# Patient Record
Sex: FEMALE | Race: WHITE | NOT HISPANIC OR LATINO | Employment: FULL TIME | URBAN - METROPOLITAN AREA
[De-identification: names, ages, dates, MRNs, and addresses within clinical notes are randomized per-mention and may not be internally consistent; named-entity substitution may affect disease eponyms.]

---

## 2020-04-29 NOTE — PROGRESS NOTES
"Lia Lewis is a 59 year old female who is being evaluated via a billable telephone visit.  She is a new patient. Previously seen at [facility] in [place].     The patient has been notified of following:     \"This telephone visit will be conducted via a call between you and your physician/provider. We have found that certain health care needs can be provided without the need for a physical exam.  This service lets us provide the care you need with a short phone conversation.  If a prescription is necessary we can send it directly to your pharmacy.  If lab work is needed we can place an order for that and you can then stop by our lab to have the test done at a later time.    Telephone visits are billed at different rates depending on your insurance coverage. During this emergency period, for some insurers they may be billed the same as an in-person visit.  Please reach out to your insurance provider with any questions.    If during the course of the call the physician/provider feels a telephone visit is not appropriate, you will not be charged for this service.\"    Patient has given verbal consent for Telephone visit?  Yes    How would you like to obtain your AVS? Mail a copy    Subjective     Lia Lewis is a 59 year old female who presents to clinic today for the following health issues:    HPI  Diabetes Follow-up    How often are you checking your blood sugar? One time daily - 250-300  What time of day are you checking your blood sugars (select all that apply)?  Before meals  Have you had any blood sugars above 200?  Yes   Have you had any blood sugars below 70?  No    What symptoms do you notice when your blood sugar is low?  None    What concerns do you have today about your diabetes?   Other: higher sugars     Do you have any of these symptoms? (Select all that apply)  No numbness or tingling in feet.  No redness, sores or blisters on feet.  No complaints of excessive thirst.  No reports of blurry " "vision.  No significant changes to weight.  Reports she has not been doing that well with diet lately due to working at home due to COVID pandemic. She has not been as active lately either.   She is out of her medications.         Hypothyroidism  Doing well. No concerns    Hyperlipidemia Follow-Up      Are you regularly taking any medication or supplement to lower your cholesterol?   Yes-  Crestor    Are you having muscle aches or other side effects that you think could be caused by your cholesterol lowering medication?  No  She denies any issues with her Crestor.       Hypertension Follow-up      Do you check your blood pressure regularly outside of the clinic? Yes     Are you following a low salt diet? Yes    Are your blood pressures ever more than 140 on the top number (systolic) OR more   than 90 on the bottom number (diastolic), for example 140/90? No    BP Readings from Last 2 Encounters:   10/15/07 110/80     LDL Cholesterol Calculated (mg/dL)   Date Value   2006 81@   04/15/2005 87@       Depression and Anxiety Follow-Up    How are you doing with your depression since your last visit?   A little worse    How are you doing with your anxiety since your last visit?  Worsened if she does'nt have amitript.    Are you having other symptoms that might be associated with depression or anxiety? Yes:  some depression symptoms that she did not have    Have you had a significant life event? OTHER: state of the [place]     Do you have any concerns with your use of alcohol or other drugs? No    Had been on sertraline 100 mg daily. She feels that it was helpful but \"fell off months ago\". Would like to restart.   Takes amitriptyine at bedtime.   Needs refills  Working at home currently.   No plans to hurt herself.   She does not have interest in counseling at this time.     Social History     Tobacco Use     Smoking status: Former Smoker     Last attempt to quit: 1996     Years since quittin.3     Tobacco " comment: no smokers in household   Substance Use Topics     Alcohol use: Yes     Comment: ashok and crm x 1-2/2-3x per yr     Drug use: No     PHQ 2020   PHQ-9 Total Score 12   Q9: Thoughts of better off dead/self-harm past 2 weeks Not at all     KATHLEEN-7 SCORE 2020   Total Score 9              Migraine     Since your last clinic visit, how have your headaches changed?  Worsened    How often are you getting headaches or migraines? More neck tension     Are you able to do normal daily activities when you have a migraine? Yes  Unless she has nausea    Are you taking rescue/relief medications? (Select all that apply) Other: tylenol    How helpful is your rescue/relief medication?  I get some relief    Are you taking any medications to prevent migraines? (Select all that apply)  Other: sertraline    In the past 4 weeks, how often have you gone to urgent care or the emergency room because of your headaches?  0    Having more than usual migraines. 4-5 days per month  Her sertraline had helped in the past. Worse without it.       Patient Active Problem List   Diagnosis     Anxiety state     Migraine     Type 2 diabetes mellitus with hyperglycemia, without long-term current use of insulin (H)     Hyperlipidemia LDL goal <100     Hypothyroidism due to acquired atrophy of thyroid     Past Surgical History:   Procedure Laterality Date     C/SECTION, LOW TRANSVERSE  x 2    3rd child due to large size on US     HC TOOTH EXTRACTION W/FORCEP       TUBAL LIGATION         Social History     Tobacco Use     Smoking status: Former Smoker     Last attempt to quit: 1996     Years since quittin.3     Tobacco comment: no smokers in household   Substance Use Topics     Alcohol use: Yes     Comment: ashok and crm x 1-2/2-3x per yr     Family History   Problem Relation Age of Onset     Breast Cancer Mother      Breast Cancer Maternal Aunt         x2 aunts     Depression Maternal Grandmother      Depression Maternal Aunt       Cancer Maternal Grandfather         skin (not melanoma)     C.AToyD. Maternal Grandfather      Hypertension Maternal Grandfather      Respiratory Father         pulmonary hypertension     Neurologic Disorder Sister         migraines         BP Readings from Last 3 Encounters:   10/15/07 110/80    Wt Readings from Last 3 Encounters:   10/15/07 100.7 kg (222 lb)                    Reviewed and updated as needed this visit by Provider  Problems         Review of Systems   ROS COMP: CONSTITUTIONAL: NEGATIVE for fever, chills, change in weight  EYES: NEGATIVE for vision changes or irritation  ENT/MOUTH: NEGATIVE for ear, mouth and throat problems  RESP: NEGATIVE for significant cough or SOB  CV: NEGATIVE for chest pain, palpitations or peripheral edema  GI: NEGATIVE for nausea, abdominal pain, heartburn, or change in bowel habits  : NEGATIVE for frequency, dysuria, or hematuria  MUSCULOSKELETAL: NEGATIVE for significant arthralgias or myalgia  NEURO: NEGATIVE for weakness, dizziness or paresthesias  ENDOCRINE: NEGATIVE for temperature intolerance, skin/hair changes  PSYCHIATRIC: see above.        Objective   Reported vitals:  There were no vitals taken for this visit.   Conducted by phone.   PSYCH: Alert and oriented times 3; coherent speech, normal   rate and volume, able to articulate logical thoughts, able   to abstract reason, no tangential thoughts, no hallucinations   or delusions  Her affect is normal and pleasant  RESP: No cough, no audible wheezing, able to talk in full sentences  Remainder of exam unable to be completed due to telephone visits    Diagnostic Test Results:  none         Assessment/Plan:  1. Type 2 diabetes mellitus with hyperglycemia, without long-term current use of insulin (H)  Patient is due for labs. She has elevated glucose readings.   Continue with metformin 1000 mg twice daily.   Continue with glipizide at 10 mg daily for now but anticipate that will increase this to 20 mg after labs  available.   Labs next week.   All questions invited, asked and answered to the patient's apparent satisfaction.  Patient agrees to plan.    - lisinopril (ZESTRIL) 5 MG tablet; Take 1 tablet (5 mg) by mouth daily  Dispense: 90 tablet; Refill: 0  - metFORMIN (GLUCOPHAGE) 1000 MG tablet; Take 1 tablet (1,000 mg) by mouth 2 times daily (with meals)  Dispense: 180 tablet; Refill: 0  - glipiZIDE (GLUCOTROL XL) 10 MG 24 hr tablet; Take 1 tablet (10 mg) by mouth daily  Dispense: 90 tablet; Refill: 0    2. Anxiety state  Recommend restart her sertraline.   Discussed how to ramp this up.   Continue with amitriptyline.   - sertraline (ZOLOFT) 50 MG tablet; Take 0.5 tablets (25 mg) by mouth daily for 8 days, THEN 1 tablet (50 mg) daily for 7 days, THEN 2 tablets (100 mg) daily for 14 days.  Dispense: 39 tablet; Refill: 0  - amitriptyline (ELAVIL) 100 MG tablet; Take 1 tablet (100 mg) by mouth At Bedtime  Dispense: 90 tablet; Refill: 0    3. Migraine without aura and without status migrainosus, not intractable  Restart sertraline as this has helped with her migraines in the past.   Refill of imitrex given.   Patient agrees to plan.   - SUMAtriptan (IMITREX) 100 MG tablet; Take 1 tablet (100 mg) by mouth See Admin Instructions WITH ONSET OF MIGRAINE, MAY REPEAT ONCE AFTER 2 HOURS. DO NOT EXCEED 2 TABLETS IN 24 HOURS.  Dispense: 12 tablet; Refill: 3    4. Hyperlipidemia LDL goal <100  Awaiting results. Dose adjustment as needed.    - rosuvastatin (CRESTOR) 20 MG tablet; Take 1 tablet (20 mg) by mouth daily  Dispense: 90 tablet; Refill: 0    5. Hypothyroidism due to acquired atrophy of thyroid  Awaiting results. Dose adjustment as needed.    - levothyroxine (SYNTHROID/LEVOTHROID) 50 MCG tablet; Take 1 tablet (50 mcg) by mouth daily  Dispense: 90 tablet; Refill: 0    Return in about 3 weeks (around 5/22/2020).      Phone call duration:  16 minutes    Marisol Schmidt MD

## 2020-05-01 ENCOUNTER — VIRTUAL VISIT (OUTPATIENT)
Dept: FAMILY MEDICINE | Facility: OTHER | Age: 60
End: 2020-05-01
Payer: COMMERCIAL

## 2020-05-01 ENCOUNTER — TELEPHONE (OUTPATIENT)
Dept: FAMILY MEDICINE | Facility: CLINIC | Age: 60
End: 2020-05-01

## 2020-05-01 DIAGNOSIS — F41.1 ANXIETY STATE: ICD-10-CM

## 2020-05-01 DIAGNOSIS — E78.5 HYPERLIPIDEMIA LDL GOAL <100: ICD-10-CM

## 2020-05-01 DIAGNOSIS — E03.4 HYPOTHYROIDISM DUE TO ACQUIRED ATROPHY OF THYROID: ICD-10-CM

## 2020-05-01 DIAGNOSIS — E11.65 TYPE 2 DIABETES MELLITUS WITH HYPERGLYCEMIA, WITHOUT LONG-TERM CURRENT USE OF INSULIN (H): Primary | ICD-10-CM

## 2020-05-01 DIAGNOSIS — G43.009 MIGRAINE WITHOUT AURA AND WITHOUT STATUS MIGRAINOSUS, NOT INTRACTABLE: ICD-10-CM

## 2020-05-01 PROCEDURE — 96127 BRIEF EMOTIONAL/BEHAV ASSMT: CPT | Mod: 95 | Performed by: FAMILY MEDICINE

## 2020-05-01 PROCEDURE — 99204 OFFICE O/P NEW MOD 45 MIN: CPT | Mod: 95 | Performed by: FAMILY MEDICINE

## 2020-05-01 RX ORDER — AMITRIPTYLINE HYDROCHLORIDE 100 MG/1
100 TABLET ORAL AT BEDTIME
Qty: 90 TABLET | Refills: 0 | Status: SHIPPED | OUTPATIENT
Start: 2020-05-01 | End: 2020-10-04

## 2020-05-01 RX ORDER — SUMATRIPTAN 100 MG/1
100 TABLET, FILM COATED ORAL SEE ADMIN INSTRUCTIONS
Qty: 12 TABLET | Refills: 3 | Status: SHIPPED | OUTPATIENT
Start: 2020-05-01 | End: 2020-09-18

## 2020-05-01 RX ORDER — LISINOPRIL 5 MG/1
5 TABLET ORAL DAILY
Qty: 90 TABLET | Refills: 0 | Status: SHIPPED | OUTPATIENT
Start: 2020-05-01 | End: 2020-11-02

## 2020-05-01 RX ORDER — GLIPIZIDE 10 MG/1
10 TABLET, FILM COATED, EXTENDED RELEASE ORAL DAILY
Qty: 90 TABLET | Refills: 0 | Status: SHIPPED | OUTPATIENT
Start: 2020-05-01 | End: 2020-05-04 | Stop reason: ALTCHOICE

## 2020-05-01 RX ORDER — SERTRALINE HYDROCHLORIDE 100 MG/1
100 TABLET, FILM COATED ORAL DAILY
Status: CANCELLED | OUTPATIENT
Start: 2020-05-01

## 2020-05-01 RX ORDER — ROSUVASTATIN CALCIUM 20 MG/1
20 TABLET, COATED ORAL DAILY
COMMUNITY
End: 2020-05-01

## 2020-05-01 RX ORDER — AMITRIPTYLINE HYDROCHLORIDE 100 MG/1
100 TABLET ORAL AT BEDTIME
COMMUNITY
End: 2020-05-01

## 2020-05-01 RX ORDER — LEVOTHYROXINE SODIUM 50 UG/1
50 TABLET ORAL DAILY
COMMUNITY
End: 2020-05-01

## 2020-05-01 RX ORDER — LISINOPRIL 5 MG/1
5 TABLET ORAL DAILY
COMMUNITY
End: 2020-05-01

## 2020-05-01 RX ORDER — ROSUVASTATIN CALCIUM 20 MG/1
20 TABLET, COATED ORAL DAILY
Qty: 90 TABLET | Refills: 0 | Status: SHIPPED | OUTPATIENT
Start: 2020-05-01 | End: 2021-01-06

## 2020-05-01 RX ORDER — LEVOTHYROXINE SODIUM 50 UG/1
50 TABLET ORAL DAILY
Qty: 90 TABLET | Refills: 0 | Status: SHIPPED | OUTPATIENT
Start: 2020-05-01 | End: 2020-11-02

## 2020-05-01 ASSESSMENT — ANXIETY QUESTIONNAIRES
1. FEELING NERVOUS, ANXIOUS, OR ON EDGE: MORE THAN HALF THE DAYS
7. FEELING AFRAID AS IF SOMETHING AWFUL MIGHT HAPPEN: NEARLY EVERY DAY
5. BEING SO RESTLESS THAT IT IS HARD TO SIT STILL: NOT AT ALL
2. NOT BEING ABLE TO STOP OR CONTROL WORRYING: MORE THAN HALF THE DAYS
IF YOU CHECKED OFF ANY PROBLEMS ON THIS QUESTIONNAIRE, HOW DIFFICULT HAVE THESE PROBLEMS MADE IT FOR YOU TO DO YOUR WORK, TAKE CARE OF THINGS AT HOME, OR GET ALONG WITH OTHER PEOPLE: VERY DIFFICULT
GAD7 TOTAL SCORE: 9
3. WORRYING TOO MUCH ABOUT DIFFERENT THINGS: SEVERAL DAYS
6. BECOMING EASILY ANNOYED OR IRRITABLE: NOT AT ALL

## 2020-05-01 ASSESSMENT — PATIENT HEALTH QUESTIONNAIRE - PHQ9
SUM OF ALL RESPONSES TO PHQ QUESTIONS 1-9: 12
5. POOR APPETITE OR OVEREATING: SEVERAL DAYS

## 2020-05-01 NOTE — TELEPHONE ENCOUNTER
Please assist in scheduling a follow up visit with me for anxiety.  Please schedule in 3 weeks. I do not have a schedule in but she can be placed on any day at 1 pm if that works for her. Let me know if not.

## 2020-05-01 NOTE — TELEPHONE ENCOUNTER
Scheduled patient for 5/15/20 1:00 for a telephone visit.  Left message for patient to call back to let her know.

## 2020-05-01 NOTE — TELEPHONE ENCOUNTER
3 weeks is 5/22/20.  Appointment changed to 5/22/20 1:00 for telephone visit.  Message was left for patient to call back.  Please inform her of appointment when call is returned.

## 2020-05-01 NOTE — TELEPHONE ENCOUNTER
Next 5 appointments (look out 90 days)    May 15, 2020  1:00 PM CDT  Telephone Visit with Marisol Schmidt MD  [facility] [address]  [facility] [address] [phone]

## 2020-05-02 ASSESSMENT — ANXIETY QUESTIONNAIRES: GAD7 TOTAL SCORE: 9

## 2020-05-04 ENCOUNTER — TELEPHONE (OUTPATIENT)
Dept: FAMILY MEDICINE | Facility: CLINIC | Age: 60
End: 2020-05-04

## 2020-05-04 DIAGNOSIS — E11.65 TYPE 2 DIABETES MELLITUS WITH HYPERGLYCEMIA, WITHOUT LONG-TERM CURRENT USE OF INSULIN (H): Primary | ICD-10-CM

## 2020-05-04 DIAGNOSIS — E11.65 TYPE 2 DIABETES MELLITUS WITH HYPERGLYCEMIA, WITHOUT LONG-TERM CURRENT USE OF INSULIN (H): ICD-10-CM

## 2020-05-04 DIAGNOSIS — E03.4 HYPOTHYROIDISM DUE TO ACQUIRED ATROPHY OF THYROID: ICD-10-CM

## 2020-05-04 LAB
ALBUMIN SERPL-MCNC: 3.5 G/DL (ref 3.4–5)
ALP SERPL-CCNC: 100 U/L (ref 40–150)
ALT SERPL W P-5'-P-CCNC: 38 U/L (ref 0–50)
ANION GAP SERPL CALCULATED.3IONS-SCNC: 7 MMOL/L (ref 3–14)
AST SERPL W P-5'-P-CCNC: 24 U/L (ref 0–45)
BILIRUB SERPL-MCNC: 0.4 MG/DL (ref 0.2–1.3)
BUN SERPL-MCNC: 13 MG/DL (ref 7–30)
CALCIUM SERPL-MCNC: 8.7 MG/DL (ref 8.5–10.1)
CHLORIDE SERPL-SCNC: 105 MMOL/L (ref 94–109)
CHOLEST SERPL-MCNC: 138 MG/DL
CO2 SERPL-SCNC: 26 MMOL/L (ref 20–32)
CREAT SERPL-MCNC: 0.8 MG/DL (ref 0.52–1.04)
CREAT UR-MCNC: 201 MG/DL
GFR SERPL CREATININE-BSD FRML MDRD: 80 ML/MIN/{1.73_M2}
GLUCOSE SERPL-MCNC: 319 MG/DL (ref 70–99)
HBA1C MFR BLD: 12.8 % (ref 0–5.6)
HDLC SERPL-MCNC: 61 MG/DL
LDLC SERPL CALC-MCNC: 44 MG/DL
MICROALBUMIN UR-MCNC: 259 MG/L
MICROALBUMIN/CREAT UR: 128.86 MG/G CR (ref 0–25)
NONHDLC SERPL-MCNC: 77 MG/DL
POTASSIUM SERPL-SCNC: 3.8 MMOL/L (ref 3.4–5.3)
PROT SERPL-MCNC: 7.5 G/DL (ref 6.8–8.8)
SODIUM SERPL-SCNC: 138 MMOL/L (ref 133–144)
T4 FREE SERPL-MCNC: 0.93 NG/DL (ref 0.76–1.46)
TRIGL SERPL-MCNC: 167 MG/DL
TSH SERPL DL<=0.005 MIU/L-ACNC: 7.22 MU/L (ref 0.4–4)

## 2020-05-04 PROCEDURE — 80061 LIPID PANEL: CPT | Performed by: FAMILY MEDICINE

## 2020-05-04 PROCEDURE — 80053 COMPREHEN METABOLIC PANEL: CPT | Performed by: FAMILY MEDICINE

## 2020-05-04 PROCEDURE — 82043 UR ALBUMIN QUANTITATIVE: CPT | Performed by: FAMILY MEDICINE

## 2020-05-04 PROCEDURE — 84439 ASSAY OF FREE THYROXINE: CPT | Performed by: FAMILY MEDICINE

## 2020-05-04 PROCEDURE — 84443 ASSAY THYROID STIM HORMONE: CPT | Performed by: FAMILY MEDICINE

## 2020-05-04 PROCEDURE — 36415 COLL VENOUS BLD VENIPUNCTURE: CPT | Performed by: FAMILY MEDICINE

## 2020-05-04 PROCEDURE — 83036 HEMOGLOBIN GLYCOSYLATED A1C: CPT | Performed by: FAMILY MEDICINE

## 2020-05-04 RX ORDER — LIRAGLUTIDE 6 MG/ML
INJECTION SUBCUTANEOUS
Qty: 6 ML | Refills: 1 | Status: SHIPPED | OUTPATIENT
Start: 2020-05-04 | End: 2020-05-07

## 2020-05-04 NOTE — TELEPHONE ENCOUNTER
Spoke with patient regarding results.     A1C 12.3. Discussed the importance of improvement in diet and activity. Will have her continue with Metformin 1000 mg twice daily. Discontinue glipizide.     Start Victoza 0.6 mg daily for one week then increase to 1.2 mg daily.  She reports knowing how to inject medication. Son has diabetes. She has no concerns with starting.     Recheck in 3 weeks as planned. Sooner if any concerns.     Discussed elevated TSH but normal T4. Will continue current dose and recheck labs in 3 months.     All questions invited, asked and answered to the patient's apparent satisfaction.  Patient agrees to plan.

## 2020-05-07 ENCOUNTER — TELEPHONE (OUTPATIENT)
Dept: SCHEDULING | Facility: CLINIC | Age: 60
End: 2020-05-07

## 2020-05-07 ENCOUNTER — TELEPHONE (OUTPATIENT)
Dept: FAMILY MEDICINE | Facility: CLINIC | Age: 60
End: 2020-05-07

## 2020-05-07 DIAGNOSIS — E11.65 TYPE 2 DIABETES MELLITUS WITH HYPERGLYCEMIA, WITHOUT LONG-TERM CURRENT USE OF INSULIN (H): Primary | ICD-10-CM

## 2020-05-07 DIAGNOSIS — E11.65 TYPE 2 DIABETES MELLITUS WITH HYPERGLYCEMIA, WITHOUT LONG-TERM CURRENT USE OF INSULIN (H): ICD-10-CM

## 2020-05-07 NOTE — TELEPHONE ENCOUNTER
Sent a prescription for Levemir to use instead of Victoza.     Levemir is 10 units once daily in the evening. It is a long acting insulin.

## 2020-05-07 NOTE — TELEPHONE ENCOUNTER
Patient called about medication - Victoza- states that it is really expensive. What is an alternative medication she can try?    [phone]    Ok to leave message    Pended pharmacy

## 2020-05-07 NOTE — TELEPHONE ENCOUNTER
Reason for call:  Other   Patient called regarding (reason for call): prescription  Additional comments: Patient states the Levamir and Victoza cost too much, is wondering about increasing dosage of Glipizide and how to go about doing so.    Phone number to reach patient:  Home number on file [phone] (home)    Best Time:  Any    Can we leave a detailed message on this number?  YES    Travel screening: Not Applicable

## 2020-05-08 RX ORDER — GLIPIZIDE 10 MG/1
20 TABLET, FILM COATED, EXTENDED RELEASE ORAL DAILY
Qty: 180 TABLET | Refills: 0 | Status: SHIPPED | OUTPATIENT
Start: 2020-05-08 | End: 2020-09-18

## 2020-05-08 NOTE — TELEPHONE ENCOUNTER
I called the pharmacy and they stated that Lantus and Basaglar are equivalent to the Levemir in cost and that it wouldn't change to cost for the patient

## 2020-05-08 NOTE — TELEPHONE ENCOUNTER
Please let patient know that all long acting insulins are about the same cost.     I do not think that increasing her Glipizide alone and continuing the metformin will be enough on its own to bring the A1C in line.  It will also take dietary changes and increase in physical activity to help the medications.     I will send a new prescription for her for the glipizide XL for a total of 20 mg daily. Continue metformin.

## 2020-05-08 NOTE — TELEPHONE ENCOUNTER
Please check with pharmacy if Lantus or Basaglar would be less expensive for this patient than the Levemir.

## 2020-05-08 NOTE — TELEPHONE ENCOUNTER
Left message for pt to return call, when call is returned give information below.    Rachel Francis CMA ([facility])

## 2020-05-11 NOTE — TELEPHONE ENCOUNTER
Left message asking patient to return call.    Please tell pt message from Dr. Schmidt:    Please let patient know that all long acting insulins are about the same cost.      I do not think that increasing her Glipizide alone and continuing the metformin will be enough on its own to bring the A1C in line.  It will also take dietary changes and increase in physical activity to help the medications.      I will send a new prescription for her for the glipizide XL for a total of 20 mg daily. Continue metformin.

## 2020-05-11 NOTE — TELEPHONE ENCOUNTER
Patient called back I relayed message below. No further concerns or questions at this time.    Lincoln Fisher MA on 5/11/2020 at 12:31 PM

## 2020-05-31 DIAGNOSIS — F41.1 ANXIETY STATE: ICD-10-CM

## 2020-06-01 NOTE — TELEPHONE ENCOUNTER
Patient is due for med recheck. Please call and schedule for video visit before the patient runs out of the medication. If they do not have enough to make it to their video visit, send back to the RN. Indicate quantity that patient will need to make it to their appointment.    Alicia Mcguire, RN, BSN

## 2020-06-04 NOTE — PROGRESS NOTES
"Lia Lewis is a 59 year old female who is being evaluated via a billable telephone visit.      The patient has been notified of following:     \"This telephone visit will be conducted via a call between you and your physician/provider. We have found that certain health care needs can be provided without the need for a physical exam.  This service lets us provide the care you need with a short phone conversation.  If a prescription is necessary we can send it directly to your pharmacy.  If lab work is needed we can place an order for that and you can then stop by our lab to have the test done at a later time.    Telephone visits are billed at different rates depending on your insurance coverage. During this emergency period, for some insurers they may be billed the same as an in-person visit.  Please reach out to your insurance provider with any questions.    If during the course of the call the physician/provider feels a telephone visit is not appropriate, you will not be charged for this service.\"    Patient has given verbal consent for Telephone visit?  Yes    What phone number would you like to be contacted at? [phone]    How would you like to obtain your AVS? Mail a copy    Subjective     Lia Lewis is a 59 year old female who presents via phone visit today for the following health issues:    HPI  Diabetes Follow-up    How often are you checking your blood sugar? One time daily  What time of day are you checking your blood sugars (select all that apply)?  in the morning  Have you had any blood sugars above 200?  Yes 3 times in last 2 weeks  Have you had any blood sugars below 70?  No    What symptoms do you notice when your blood sugar is low?  Not applicable    What concerns do you have today about your diabetes? Blood sugar is often over 200 only a few times in the last couple of weeks     Do you have any of these symptoms? (Select all that apply)  No numbness or tingling in feet.  No redness, " sores or blisters on feet.  No complaints of excessive thirst.  No reports of blurry vision.  No significant changes to weight.    Have you had a diabetic eye exam in the last 12 months? No        BP Readings from Last 2 Encounters:   10/15/07 110/80     Hemoglobin A1C (%)   Date Value   2020 12.8 (H)     LDL Cholesterol Calculated (mg/dL)   Date Value   2020 44   2006 81@         How many servings of fruits and vegetables do you eat daily?  4 or more    On average, how many sweetened beverages do you drink each day (Examples: soda, juice, sweet tea, etc.  Do NOT count diet or artificially sweetened beverages)?   1    How many days per week do you exercise enough to make your heart beat faster? 4    How many minutes a day do you exercise enough to make your heart beat faster? 30 - 60    How many days per week do you miss taking your medication? 0    Patient Active Problem List   Diagnosis     Anxiety state     Migraine     Type 2 diabetes mellitus with hyperglycemia, without long-term current use of insulin (H)     Hyperlipidemia LDL goal <100     Hypothyroidism due to acquired atrophy of thyroid     Past Surgical History:   Procedure Laterality Date     C/SECTION, LOW TRANSVERSE  x 2    3rd child due to large size on US     HC TOOTH EXTRACTION W/FORCEP       TUBAL LIGATION         Social History     Tobacco Use     Smoking status: Former Smoker     Last attempt to quit: 1996     Years since quittin.4     Smokeless tobacco: Never Used     Tobacco comment: no smokers in household   Substance Use Topics     Alcohol use: Yes     Comment: ashok and crm x 1-2/2-3x per yr     Family History   Problem Relation Age of Onset     Breast Cancer Mother      Breast Cancer Maternal Aunt         x2 aunts     Depression Maternal Grandmother      Depression Maternal Aunt      Cancer Maternal Grandfather         skin (not melanoma)     C.A.D. Maternal Grandfather      Hypertension Maternal Grandfather       Respiratory Father         pulmonary hypertension     Neurologic Disorder Sister         migraines         Current Outpatient Medications   Medication Sig Dispense Refill     ACCU-CHEK SARAH PLUS test strip USE TO TEST ONCE DAILY       amitriptyline (ELAVIL) 100 MG tablet Take 1 tablet (100 mg) by mouth At Bedtime 90 tablet 0     blood glucose monitoring (ACCU-CHEK FASTCLIX) lancets USE 1 DAILY AS DIRECTED       glipiZIDE (GLUCOTROL XL) 10 MG 24 hr tablet Take 2 tablets (20 mg) by mouth daily 180 tablet 0     levothyroxine (SYNTHROID/LEVOTHROID) 50 MCG tablet Take 1 tablet (50 mcg) by mouth daily 90 tablet 0     lisinopril (ZESTRIL) 5 MG tablet Take 1 tablet (5 mg) by mouth daily 90 tablet 0     metFORMIN (GLUCOPHAGE) 1000 MG tablet Take 1 tablet (1,000 mg) by mouth 2 times daily (with meals) 180 tablet 3     rosuvastatin (CRESTOR) 20 MG tablet Take 1 tablet (20 mg) by mouth daily 90 tablet 0     sertraline (ZOLOFT) 100 MG tablet Take 1 tablet (100 mg) by mouth daily 90 tablet 3     SUMAtriptan (IMITREX) 100 MG tablet Take 1 tablet (100 mg) by mouth See Admin Instructions WITH ONSET OF MIGRAINE, MAY REPEAT ONCE AFTER 2 HOURS. DO NOT EXCEED 2 TABLETS IN 24 HOURS. 12 tablet 3     ELAVIL 25 MG OR TABS ONE AT BEDTIME 30 0     VICTOZA PEN 18 MG/3ML soln        Allergies   Allergen Reactions     Codeine      vomiting     Recent Labs   Lab Test 05/04/20  0910   A1C 12.8*   LDL 44   HDL 61   TRIG 167*   ALT 38   CR 0.80   GFRESTIMATED 80   GFRESTBLACK >90   POTASSIUM 3.8   TSH 7.22*      BP Readings from Last 3 Encounters:   10/15/07 110/80    Wt Readings from Last 3 Encounters:   10/15/07 100.7 kg (222 lb)                    Reviewed and updated as needed this visit by Provider         Review of Systems   Constitutional, HEENT, cardiovascular, pulmonary, gi and gu systems are negative, except as otherwise noted.       Objective   Reported vitals:  There were no vitals taken for this visit.   healthy, alert and no  distress  PSYCH: Alert and oriented times 3; coherent speech, normal   rate and volume, able to articulate logical thoughts, able   to abstract reason, no tangential thoughts, no hallucinations   or delusions  Her affect is normal and pleasant  RESP: No cough, no audible wheezing, able to talk in full sentences  Remainder of exam unable to be completed due to telephone visits    Diagnostic Test Results:  Labs reviewed in Epic      ASSESSMENT and PLAN  1. Anxiety state  59-year-old female with history of anxiety, currently well controlled on sertraline 100 mg daily.  Currently feels safe without suicidal or homicidal ideation.  Medication refilled.  - sertraline (ZOLOFT) 100 MG tablet; Take 1 tablet (100 mg) by mouth daily  Dispense: 90 tablet; Refill: 3    2. Type 2 diabetes mellitus with hyperglycemia, without long-term current use of insulin (H)  Patient's glipizide was recently increased, she has noticed improvement in her fasting morning glucose, she has also increased her exercise.  She was prescribed Victoza, however she was unable to afford the $600 per month co-pay for that.  She will follow-up with her PCP in 3 months for hemoglobin A1c retesting.  - metFORMIN (GLUCOPHAGE) 1000 MG tablet; Take 1 tablet (1,000 mg) by mouth 2 times daily (with meals)  Dispense: 180 tablet; Refill: 3    3. Migraine without aura and without status migrainosus, not intractable  She has a history of migraines, currently had a migraine this morning, she takes amitriptyline nightly for preventive, and Imitrex for abortive therapy.  This is worked for her in the past.     4. Hypothyroidism due to acquired atrophy of thyroid  She has a history of hypothyroidism, her last TSH was elevated, her PCP wants to repeat the TSH in 3 months.  Patient is tracking this and will have that done.    Return in about 3 months (around 9/8/2020) for Follow up with PCP.     Jono Mcdaniel MD, FAAFP  Family Medicine Physician  [facility]  [place]  [address] [place] [address]          Phone call duration:  6 minutes    Jono Mcdaniel MD

## 2020-06-05 DIAGNOSIS — F41.1 ANXIETY STATE: ICD-10-CM

## 2020-06-05 NOTE — TELEPHONE ENCOUNTER
Reason for Call:  Medication or medication refill:     Do you use a [facility]?  Name of the pharmacy and phone number for the current request:  [facility] [address]     Name of the medication requested: sertraline 100 MG    Other request: Pharmacy calling to check on status. Patient has appointment on 6/8    Can we leave a detailed message on this number? YES    Phone number patient can be reached at: Home number on file [phone] (home)    Best Time: any    Call taken on 6/5/2020 at 10:46 AM by Masrha Sen

## 2020-06-08 ENCOUNTER — VIRTUAL VISIT (OUTPATIENT)
Dept: FAMILY MEDICINE | Facility: OTHER | Age: 60
End: 2020-06-08
Payer: COMMERCIAL

## 2020-06-08 DIAGNOSIS — E03.4 HYPOTHYROIDISM DUE TO ACQUIRED ATROPHY OF THYROID: ICD-10-CM

## 2020-06-08 DIAGNOSIS — G43.009 MIGRAINE WITHOUT AURA AND WITHOUT STATUS MIGRAINOSUS, NOT INTRACTABLE: ICD-10-CM

## 2020-06-08 DIAGNOSIS — F41.1 ANXIETY STATE: Primary | ICD-10-CM

## 2020-06-08 DIAGNOSIS — E11.65 TYPE 2 DIABETES MELLITUS WITH HYPERGLYCEMIA, WITHOUT LONG-TERM CURRENT USE OF INSULIN (H): ICD-10-CM

## 2020-06-08 PROCEDURE — 99214 OFFICE O/P EST MOD 30 MIN: CPT | Mod: 95 | Performed by: FAMILY MEDICINE

## 2020-06-08 RX ORDER — SERTRALINE HYDROCHLORIDE 100 MG/1
100 TABLET, FILM COATED ORAL DAILY
Qty: 90 TABLET | Refills: 3 | Status: SHIPPED | OUTPATIENT
Start: 2020-06-08 | End: 2021-01-06

## 2020-06-08 RX ORDER — LANCETS
EACH MISCELLANEOUS
COMMUNITY
Start: 2019-10-28 | End: 2020-09-14

## 2020-06-08 RX ORDER — BLOOD SUGAR DIAGNOSTIC
STRIP MISCELLANEOUS
COMMUNITY
Start: 2019-11-26 | End: 2021-01-06

## 2020-06-08 RX ORDER — LIRAGLUTIDE 6 MG/ML
INJECTION SUBCUTANEOUS
COMMUNITY
Start: 2020-05-07 | End: 2020-11-02

## 2020-08-18 ENCOUNTER — TELEPHONE (OUTPATIENT)
Dept: FAMILY MEDICINE | Facility: CLINIC | Age: 60
End: 2020-08-18

## 2020-08-18 NOTE — TELEPHONE ENCOUNTER
Summary:    Patient is due/failing the following:   A1C, COLONOSCOPY, MAMMOGRAM, PAP and PHYSICAL    Action needed:   Patient needs office visit for physical, PAP and diabetic follow up.    Type of outreach:    Phone, left message for patient to call back.     Questions for provider review:    None                                                                                                                                    Kalee Chavez CMA       Chart routed to Care Team .          Panel Management Review      Patient has the following on her problem list:     Diabetes    ASA: Passed    Last A1C  Lab Results   Component Value Date    A1C 12.8 05/04/2020     A1C tested: FAILED    Last LDL:    Lab Results   Component Value Date    CHOL 138 05/04/2020     Lab Results   Component Value Date    HDL 61 05/04/2020     Lab Results   Component Value Date    LDL 44 05/04/2020     Lab Results   Component Value Date    TRIG 167 05/04/2020     No results found for: CHOLHDLRATIO  Lab Results   Component Value Date    NHDL 77 05/04/2020       Is the patient on a Statin? YES             Is the patient on Aspirin? NO    Medications     HMG CoA Reductase Inhibitors     rosuvastatin (CRESTOR) 20 MG tablet             Last three blood pressure readings:  BP Readings from Last 3 Encounters:   10/15/07 110/80          Tobacco History:     History   Smoking Status     Former Smoker     Quit date: 1/1/1996   Smokeless Tobacco     Never Used     Comment: no smokers in household           Composite cancer screening  Chart review shows that this patient is due/due soon for the following Pap Smear, Mammogram and Colonoscopy

## 2020-09-08 DIAGNOSIS — G43.009 MIGRAINE WITHOUT AURA AND WITHOUT STATUS MIGRAINOSUS, NOT INTRACTABLE: ICD-10-CM

## 2020-09-10 NOTE — TELEPHONE ENCOUNTER
Pending Prescriptions:                       Disp   Refills    SUMAtriptan (IMITREX) 100 MG tablet        12 tab*3        Sig: Take 1 tablet (100 mg) by mouth See Admin           Instructions WITH ONSET OF MIGRAINE, MAY REPEAT           ONCE AFTER 2 HOURS. DO NOT EXCEED 2 TABLETS IN 24           HOURS.      Support Staff:   Please call patient to schedule a visit. (F2F, Video, Phone or E-visit) (around 9/8/2020) for Follow up with PCP.   Then ask if patient will need a refill of the above medication before the visit.   Please re-flag for RN and then route to refill pool to give a 30 or 90 day veronica to get them to their visit or to remove the medication and to close the encounter.    After 3 attempts to reach patient, please route to provider to review and advise.    Thank you!    Je Cohen, BSN, RN, PHN

## 2020-09-14 DIAGNOSIS — E11.65 TYPE 2 DIABETES MELLITUS WITH HYPERGLYCEMIA, WITHOUT LONG-TERM CURRENT USE OF INSULIN (H): Primary | ICD-10-CM

## 2020-09-14 NOTE — TELEPHONE ENCOUNTER
Reason for Call:  Medication or medication refill:    Do you use a [facility]?  Name of the pharmacy and phone number for the [facility] request[facility]  [place] [phone]    Name of the medication requested: Accu Chek test strip and glipiside 10 mg    Other request: [facility] calling , his fax machine is not working and he is requesting refills.      Can we leave a detailed message on this number? YES    Phone number patient can be reached at: Other phone number:  [phone] is [facility]    Best Time: any      Call taken on 9/14/2020 at 4:30 PM by Alexa Jiang

## 2020-09-14 NOTE — TELEPHONE ENCOUNTER
Routing refill request to provider for review/approval because:  Drug not active on patient's medication list  Medication is reported/historical    Flag for provider to review.    Je Cohen, ROMELIAN, RN, PHN

## 2020-09-14 NOTE — LETTER
[facility]  [address]  [place] [id number] [phone]      September 23, 2020    Lia Lewis                                                                                                                     [address] [facility] [address]        Dear Lia,    We have attempted to contact you by telephone without success.  We have sent a one time refill for the medication you requested to the pharmacy for you.  In order to continue refilling this medication, we request that you schedule a video appointment with your Provider.  You can call us at [phone] to schedule this appointment in [place] or [phone] to schedule this appointment in [place].    Thank you,  [facility] Support Staff / Priti

## 2020-09-15 RX ORDER — LANCETS
EACH MISCELLANEOUS
Qty: 100 EACH | Refills: 3 | Status: SHIPPED | OUTPATIENT
Start: 2020-09-15 | End: 2021-11-02

## 2020-09-18 RX ORDER — BLOOD SUGAR DIAGNOSTIC
STRIP MISCELLANEOUS
Status: CANCELLED | OUTPATIENT
Start: 2020-09-18

## 2020-09-18 RX ORDER — SUMATRIPTAN 100 MG/1
100 TABLET, FILM COATED ORAL SEE ADMIN INSTRUCTIONS
Qty: 12 TABLET | Refills: 0 | Status: SHIPPED | OUTPATIENT
Start: 2020-09-18 | End: 2020-10-07

## 2020-09-18 RX ORDER — GLIPIZIDE 10 MG/1
20 TABLET, FILM COATED, EXTENDED RELEASE ORAL DAILY
Qty: 180 TABLET | Refills: 0 | Status: SHIPPED | OUTPATIENT
Start: 2020-09-18 | End: 2021-01-04

## 2020-09-18 NOTE — TELEPHONE ENCOUNTER
Left message for pt to return call, when call is returned give information below and help schedule.      Rachel Francis CMA ([facility])

## 2020-10-05 DIAGNOSIS — G43.009 MIGRAINE WITHOUT AURA AND WITHOUT STATUS MIGRAINOSUS, NOT INTRACTABLE: ICD-10-CM

## 2020-10-06 NOTE — TELEPHONE ENCOUNTER
Routing refill request to provider for review/approval because:  Labs not current:  BP    ROMELIA ChoiN, RN  [facility]

## 2020-10-07 RX ORDER — SUMATRIPTAN 100 MG/1
100 TABLET, FILM COATED ORAL SEE ADMIN INSTRUCTIONS
Qty: 12 TABLET | Refills: 0 | Status: SHIPPED | OUTPATIENT
Start: 2020-10-07 | End: 2020-11-09

## 2020-10-23 NOTE — PROGRESS NOTES
"Lia Lewis is a 60 year old female who is being evaluated via a billable video visit.      The patient has been notified of following:     \"This video visit will be conducted via a call between you and your physician/provider. We have found that certain health care needs can be provided without the need for an in-person physical exam.  This service lets us provide the care you need with a video conversation.  If a prescription is necessary we can send it directly to your pharmacy.  If lab work is needed we can place an order for that and you can then stop by our lab to have the test done at a later time.    Video visits are billed at different rates depending on your insurance coverage.  Please reach out to your insurance provider with any questions.    If during the course of the call the physician/provider feels a video visit is not appropriate, you will not be charged for this service.\"    Patient has given verbal consent for Video visit? Yes  How would you like to obtain your AVS? MyChart  If you are dropped from the video visit, the video invite should be resent to: Text to cell phone: [phone]  Will anyone else be joining your video visit? No    Subjective     Lia Lewis is a 60 year old female who presents today via video visit for the following health issues:    HPI     Diabetes Follow-up- diagnosed ~ 3  yr ago  Needs new test strips    Medications: metformin and glipizide.   Last a1c: 12.8% - May 2020 (6mo ago)   ACEi: lisinopril  Statin: crestor - lipids may 2020  Eye exam: due for exam- 18mo +  Foot concerns/exam:  Blood sugars: Testing am fasting  Hypoglycemia:  None   Exercise: 4d- 20min   Weight: stable, maybe less based on clothing fit.     Microalbuminuria on labs 05/2020  Neuropathy- denies   Vision -  Wears glasses. No vision changes.    Challenging with working from home (due to Covid pandemic).  Exercises at home.  Watching what she eats  Blood sugars around 200 for last few months. " Not often under 200. Lowest 147.     Could do Labs 5:15pm - Thursday.     Had flu shot - 10/10/2020    Mammogram - due for mammo.     How often are you checking your blood sugar? One time daily  What time of day are you checking your blood sugars (select all that apply)?  Before meals  Have you had any blood sugars above 200?  Yes just over   Have you had any blood sugars below 70?  No    What symptoms do you notice when your blood sugar is low?  None    What concerns do you have today about your diabetes? None     Do you have any of these symptoms? (Select all that apply)  No numbness or tingling in feet.  No redness, sores or blisters on feet.  No complaints of excessive thirst.  No reports of blurry vision.  No significant changes to weight.    Have you had a diabetic eye exam in the last 12 months? No                Hyperlipidemia Follow-Up      Are you regularly taking any medication or supplement to lower your cholesterol?   Yes- crestor    Are you having muscle aches or other side effects that you think could be caused by your cholesterol lowering medication?  No    Hypertension Follow-up      Do you check your blood pressure regularly outside of the clinic? No     Are you following a low salt diet? Yes    Are your blood pressures ever more than 140 on the top number (systolic) OR more   than 90 on the bottom number (diastolic), for example 140/90? No    BP Readings from Last 2 Encounters:   10/15/07 110/80     Hemoglobin A1C (%)   Date Value   05/04/2020 12.8 (H)     LDL Cholesterol Calculated (mg/dL)   Date Value   05/04/2020 44   05/02/2006 81@       Depression and Anxiety Follow-Up  Sx affecting motivation. Thinks affecting how well taking care of diabetes    How are you doing with your depression since your last visit? No change    How are you doing with your anxiety since your last visit?  No change    Are you having other symptoms that might be associated with depression or anxiety? Yes:  feeling so  many things between working at home, polictical concerns and covid concerns    Have you had a significant life event? No     Do you have any concerns with your use of alcohol or other drugs? No     Migraines much less frequent and much less time with migraine sx.  On amitriptyline for that.         Social History     Tobacco Use     Smoking status: Former Smoker     Quit date: 1996     Years since quittin.8     Smokeless tobacco: Never Used     Tobacco comment: no smokers in household   Substance Use Topics     Alcohol use: Yes     Comment: ashok and crm x 1-2/2-3x per yr     Drug use: No     PHQ 2020 10/26/2020   PHQ-9 Total Score 12 8   Q9: Thoughts of better off dead/self-harm past 2 weeks Not at all Not at all     KATHLEEN-7 SCORE 2020 10/26/2020   Total Score 9 8   Suicide Assessment Five-step Evaluation and Treatment (SAFE-T)      Video Start Time: 5:43pm      Review of Systems   Constitutional, HEENT, cardiovascular, pulmonary, gi and gu systems are negative, except as otherwise noted.      Objective           Vitals:  No vitals were obtained today due to virtual visit.    Physical Exam     GENERAL: Healthy, alert and no distress  EYES: Eyes grossly normal to inspection.  No discharge or erythema, or obvious scleral/conjunctival abnormalities.  RESP: No audible wheeze, cough, or visible cyanosis.  No visible retractions or increased work of breathing.    SKIN: Visible skin clear. No significant rash, abnormal pigmentation or lesions.  NEURO: Cranial nerves grossly intact.  Mentation and speech appropriate for age.  PSYCH: Mentation appears normal, affect normal/bright, judgement and insight intact, normal speech and appearance well-groomed.      Labs to be done later this week         Assessment & Plan     Type 2 diabetes mellitus with hyperglycemia, without long-term current use of insulin (H)  Home readings are still elevated around 200.   Reordered test strips  Continue metformin and  glipizide  discussed addition of 3rd medication if a1c above goal. PCP had advised victoza but it was nonformulary (?) . Advised finding out options that are formulary.  Discussed GLP1 agents, SGLT2 agents, and basal insulin.   She is open to injectable medications or insulin if needed- cost/copay/formulary is the biggest concern for her.   - HEMOGLOBIN A1C; Future  - blood glucose (NO BRAND SPECIFIED) test strip; Use to test blood sugar 1-2 times daily or as directed. To accompany: Blood Glucose Monitor Brands: per insurance. (Accu-chek product)  - Albumin Random Urine Quantitative with Creat Ratio; Future    Hypothyroidism due to acquired atrophy of thyroid  Follow up to last TSH (was elevated)- adjust dose of levothyroxine as needed.  - TSH with free T4 reflex; Future    Microalbuminuria  Elevated on labs from 05/2020. Pt has had improved glycemic control which hopefully will have resulted in improvement. Consider increasing lisinopril dose if needed.  - Albumin Random Urine Quantitative with Creat Ratio; Future    Encounter for screening mammogram for malignant neoplasm of breast  Order for screening mammogram   - MA Screening Digital Bilateral; Future    Migraine without aura and without status migrainosus, not intractable  Pt has had good results w/prevention on amitriptyline           Follow Up: The patient was instructed to contact clinic for worsening symptoms, non-improvement as expected/discussed, and for questions regarding medications or treatment plan. Discussed parameters for follow up and included in After Visit Summary given to patient.      Return in about 3 days (around 10/29/2020) for Diabetes, Lab Work.    Alicia Anaya PA-C  [facility]      Video-Visit Details    Type of service:  Video Visit    Video End Time:6:09 PM    Originating Location (pt. Location): Home    Distant Location (provider location):  [facility]     Platform used for Video Visit:  AmWell

## 2020-10-26 ENCOUNTER — VIRTUAL VISIT (OUTPATIENT)
Dept: FAMILY MEDICINE | Facility: CLINIC | Age: 60
End: 2020-10-26
Payer: COMMERCIAL

## 2020-10-26 DIAGNOSIS — R80.9 MICROALBUMINURIA: ICD-10-CM

## 2020-10-26 DIAGNOSIS — E11.65 TYPE 2 DIABETES MELLITUS WITH HYPERGLYCEMIA, WITHOUT LONG-TERM CURRENT USE OF INSULIN (H): Primary | ICD-10-CM

## 2020-10-26 DIAGNOSIS — G43.009 MIGRAINE WITHOUT AURA AND WITHOUT STATUS MIGRAINOSUS, NOT INTRACTABLE: ICD-10-CM

## 2020-10-26 DIAGNOSIS — E03.4 HYPOTHYROIDISM DUE TO ACQUIRED ATROPHY OF THYROID: ICD-10-CM

## 2020-10-26 DIAGNOSIS — Z12.31 ENCOUNTER FOR SCREENING MAMMOGRAM FOR MALIGNANT NEOPLASM OF BREAST: ICD-10-CM

## 2020-10-26 PROCEDURE — 99214 OFFICE O/P EST MOD 30 MIN: CPT | Mod: 95 | Performed by: PHYSICIAN ASSISTANT

## 2020-10-26 ASSESSMENT — ANXIETY QUESTIONNAIRES
IF YOU CHECKED OFF ANY PROBLEMS ON THIS QUESTIONNAIRE, HOW DIFFICULT HAVE THESE PROBLEMS MADE IT FOR YOU TO DO YOUR WORK, TAKE CARE OF THINGS AT HOME, OR GET ALONG WITH OTHER PEOPLE: SOMEWHAT DIFFICULT
5. BEING SO RESTLESS THAT IT IS HARD TO SIT STILL: SEVERAL DAYS
GAD7 TOTAL SCORE: 8
2. NOT BEING ABLE TO STOP OR CONTROL WORRYING: SEVERAL DAYS
1. FEELING NERVOUS, ANXIOUS, OR ON EDGE: NEARLY EVERY DAY
6. BECOMING EASILY ANNOYED OR IRRITABLE: NOT AT ALL
7. FEELING AFRAID AS IF SOMETHING AWFUL MIGHT HAPPEN: SEVERAL DAYS
3. WORRYING TOO MUCH ABOUT DIFFERENT THINGS: SEVERAL DAYS

## 2020-10-26 ASSESSMENT — PATIENT HEALTH QUESTIONNAIRE - PHQ9
SUM OF ALL RESPONSES TO PHQ QUESTIONS 1-9: 8
5. POOR APPETITE OR OVEREATING: SEVERAL DAYS

## 2020-10-27 ENCOUNTER — TELEPHONE (OUTPATIENT)
Dept: FAMILY MEDICINE | Facility: CLINIC | Age: 60
End: 2020-10-27

## 2020-10-27 ASSESSMENT — ANXIETY QUESTIONNAIRES: GAD7 TOTAL SCORE: 8

## 2020-10-27 NOTE — TELEPHONE ENCOUNTER
Please add pt to lab schedule in [place] for :  Lab 5:15pm - Thursday 10-  Thanks. Alicia Anaya PA-C

## 2020-10-29 DIAGNOSIS — E11.65 TYPE 2 DIABETES MELLITUS WITH HYPERGLYCEMIA, WITHOUT LONG-TERM CURRENT USE OF INSULIN (H): ICD-10-CM

## 2020-10-29 DIAGNOSIS — E03.4 HYPOTHYROIDISM DUE TO ACQUIRED ATROPHY OF THYROID: ICD-10-CM

## 2020-10-29 DIAGNOSIS — R80.9 MICROALBUMINURIA: ICD-10-CM

## 2020-10-29 LAB — HBA1C MFR BLD: 10.2 % (ref 0–5.6)

## 2020-10-29 PROCEDURE — 82043 UR ALBUMIN QUANTITATIVE: CPT | Performed by: PHYSICIAN ASSISTANT

## 2020-10-29 PROCEDURE — 84443 ASSAY THYROID STIM HORMONE: CPT | Performed by: PHYSICIAN ASSISTANT

## 2020-10-29 PROCEDURE — 83036 HEMOGLOBIN GLYCOSYLATED A1C: CPT | Performed by: PHYSICIAN ASSISTANT

## 2020-10-30 LAB
CREAT UR-MCNC: 124 MG/DL
MICROALBUMIN UR-MCNC: 23 MG/L
MICROALBUMIN/CREAT UR: 18.39 MG/G CR (ref 0–25)
TSH SERPL DL<=0.005 MIU/L-ACNC: 2.71 MU/L (ref 0.4–4)

## 2020-11-02 ENCOUNTER — TELEPHONE (OUTPATIENT)
Dept: FAMILY MEDICINE | Facility: CLINIC | Age: 60
End: 2020-11-02

## 2020-11-02 DIAGNOSIS — E03.4 HYPOTHYROIDISM DUE TO ACQUIRED ATROPHY OF THYROID: ICD-10-CM

## 2020-11-02 DIAGNOSIS — E11.65 TYPE 2 DIABETES MELLITUS WITH HYPERGLYCEMIA, WITHOUT LONG-TERM CURRENT USE OF INSULIN (H): Primary | ICD-10-CM

## 2020-11-02 DIAGNOSIS — E11.65 TYPE 2 DIABETES MELLITUS WITH HYPERGLYCEMIA, WITHOUT LONG-TERM CURRENT USE OF INSULIN (H): ICD-10-CM

## 2020-11-02 RX ORDER — PEN NEEDLE, DIABETIC 32GX 5/32"
NEEDLE, DISPOSABLE MISCELLANEOUS
Qty: 100 EACH | Refills: 3 | Status: SHIPPED | OUTPATIENT
Start: 2020-11-02 | End: 2021-11-02

## 2020-11-02 RX ORDER — GLUCOSAMINE HCL/CHONDROITIN SU 500-400 MG
CAPSULE ORAL
Qty: 100 EACH | Refills: 3 | Status: SHIPPED | OUTPATIENT
Start: 2020-11-02 | End: 2021-01-06

## 2020-11-02 RX ORDER — LEVOTHYROXINE SODIUM 50 UG/1
50 TABLET ORAL DAILY
Qty: 90 TABLET | Refills: 1 | Status: SHIPPED | OUTPATIENT
Start: 2020-11-02 | End: 2021-05-27

## 2020-11-02 NOTE — TELEPHONE ENCOUNTER
LM on pt cell to call clinic (Re: discuss labs and next steps for diabetes management).  Refilled levothyroxine at present dose- TSH is at goal.   Alicia Anaya PA-C

## 2020-11-03 ENCOUNTER — TELEPHONE (OUTPATIENT)
Dept: FAMILY MEDICINE | Facility: CLINIC | Age: 60
End: 2020-11-03

## 2020-11-03 DIAGNOSIS — E11.65 TYPE 2 DIABETES MELLITUS WITH HYPERGLYCEMIA, WITHOUT LONG-TERM CURRENT USE OF INSULIN (H): Primary | ICD-10-CM

## 2020-11-03 NOTE — TELEPHONE ENCOUNTER
Called pt with lab results.   Her a1c is 10.2%. she is on metformin and glipizide.   Her blood sugars are running in the 200s.   Discussed starting basal insulin. She is familiar with insulin, spouse had diabetes and son has diabetes.   We do not have a current weight for pt. She estimates she weighs 240 lb.   Insulin start at 02.u/kg/d- will estimate based on wt of 200 lb to start conservatively.   Discussed s/sx of hypoglycemia and how to treat  Discussed notifying the DMV.   Send fasting blood sugars in 1 week.   F/u visit in clinic in 3-4 weeks, sooner if concerns.   Alicia Anaya PA-C

## 2020-11-03 NOTE — TELEPHONE ENCOUNTER
Reason for Call:  Other prescription    Detailed comments: Trice is calling from [facility] in regards to a prescription for Blood Glucose test strip. They are stating that the insurance wants [employer] contournext and they would need the meter/lancets. So in order for the pharmacy to fill they need a new order. Thank you    Phone Number Patient can be reached at: Other phone number:  [phone]    Best Time: Anytime    Can we leave a detailed message on this number? YES    Call taken on 11/3/2020 at 2:28 PM by Ariana Crane

## 2020-11-04 RX ORDER — LANCETS
EACH MISCELLANEOUS
Qty: 100 EACH | Refills: 6 | Status: SHIPPED | OUTPATIENT
Start: 2020-11-04 | End: 2021-11-02

## 2020-11-04 RX ORDER — GLUCOSAMINE HCL/CHONDROITIN SU 500-400 MG
CAPSULE ORAL
Qty: 100 EACH | Refills: 3 | Status: SHIPPED | OUTPATIENT
Start: 2020-11-04

## 2020-11-05 DIAGNOSIS — G43.009 MIGRAINE WITHOUT AURA AND WITHOUT STATUS MIGRAINOSUS, NOT INTRACTABLE: ICD-10-CM

## 2020-11-09 RX ORDER — SUMATRIPTAN 100 MG/1
100 TABLET, FILM COATED ORAL SEE ADMIN INSTRUCTIONS
Qty: 12 TABLET | Refills: 0 | Status: SHIPPED | OUTPATIENT
Start: 2020-11-09 | End: 2021-01-04

## 2020-11-09 NOTE — TELEPHONE ENCOUNTER
Reason for Call:  Medication or medication refill:    Do you use a [facility]?  Name of the pharmacy and phone number for the current request:  [facility] [place] [phone]    Name of the medication requested: Imitrex    Other request: patients pharmacy calling to request refill    Can we leave a detailed message on this number? YES    Phone number patient can be reached at: Home number on file [phone] (home)    Best Time: any    Call taken on 11/9/2020 at 12:21 PM by Sobeida Osman

## 2020-12-31 ENCOUNTER — TELEPHONE (OUTPATIENT)
Dept: FAMILY MEDICINE | Facility: CLINIC | Age: 60
End: 2020-12-31

## 2020-12-31 DIAGNOSIS — E11.65 TYPE 2 DIABETES MELLITUS WITH HYPERGLYCEMIA, WITHOUT LONG-TERM CURRENT USE OF INSULIN (H): ICD-10-CM

## 2020-12-31 DIAGNOSIS — G43.009 MIGRAINE WITHOUT AURA AND WITHOUT STATUS MIGRAINOSUS, NOT INTRACTABLE: ICD-10-CM

## 2021-01-04 RX ORDER — GLIPIZIDE 10 MG/1
20 TABLET, FILM COATED, EXTENDED RELEASE ORAL DAILY
Qty: 180 TABLET | Refills: 0 | Status: SHIPPED | OUTPATIENT
Start: 2021-01-04 | End: 2021-04-22

## 2021-01-04 RX ORDER — SUMATRIPTAN 100 MG/1
100 TABLET, FILM COATED ORAL SEE ADMIN INSTRUCTIONS
Qty: 12 TABLET | Refills: 0 | Status: SHIPPED | OUTPATIENT
Start: 2021-01-04 | End: 2021-02-09

## 2021-01-04 NOTE — TELEPHONE ENCOUNTER
Spoke with patient appointment scheduled     Next 5 appointments (look out 90 days)    Jan 06, 2021  5:20 PM  Office Visit with MANUELA [facility] ([facility]) [address], [address]  [place] [id number] [phone]        Closing encounter  Malu العلي RT (R)

## 2021-01-05 NOTE — PROGRESS NOTES
"  Assessment & Plan        BMI:   Estimated body mass index is 39.95 kg/m  as calculated from the following:    Height as of this encounter: 1.676 m (5' 6\").    Weight as of this encounter: 112.3 kg (247 lb 8 oz).   Weight management plan: Discussed healthy diet and exercise guidelines      MANUELA Gregorio CNP  [facility] Lewis is a 60 year old female who presents to clinic today for the following health issues     History of Present Illness       Mental Health Follow-up:  Patient presents to follow-up on Anxiety.    Patient's anxiety since last visit has been:  Better  The patient is having other symptoms associated with anxiety.  Any significant life events: No  Patient is feeling anxious or having panic attacks.  Patient has no concerns about alcohol or drug use.     Social History  Tobacco Use    Smoking status: Former Smoker      Quit date: 1996      Years since quittin.0    Smokeless tobacco: Never Used    Tobacco comment: no smokers in household  Alcohol use: Yes    Comment: ashok and crm x 1-2/2-3x per yr  Drug use: No      Today's PHQ-9         PHQ-9 Total Score:     (P) 9   PHQ-9 Q9 Thoughts of better off dead/self-harm past 2 weeks :   (P) Not at all   Thoughts of suicide or self harm:      Self-harm Plan:        Self-harm Action:          Safety concerns for self or others:           Diabetes:   She presents for follow up of diabetes.  She is checking home blood glucose one time daily. She checks blood glucose before meals.  Blood glucose is sometimes over 200 and never under 70. She is aware of hypoglycemia symptoms including shakiness. She has no concerns regarding her diabetes at this time.  She is not experiencing numbness or burning in feet, excessive thirst, blurry vision, weight changes or redness, sores or blisters on feet. The patient has not had a diabetic eye exam in the last 12 months.         Hypothyroidism:     Since last " "visit, patient describes the following symptoms::  Dry skin and Hair loss    Migraines:   Since the patient's last clinic visit, headaches are: no change  The patient is getting headaches:  1 in the last 2 weeks forgot to take pills  She is able to do normal daily activities when she has a migraine.  The patient is taking the following rescue/relief medications:  Tylenol and sumatriptan (Imitrex)   Patient states \"I get total relief\" from the rescue/relief medications.   The patient is taking the following medications to prevent migraines:  Amitriptyline  In the past 4 weeks, the patient has gone to an Urgent Care or Emergency Room 0 times times due to headaches.    She eats 2-3 servings of fruits and vegetables daily.She consumes 2 sweetened beverage(s) daily.She exercises with enough effort to increase her heart rate 20 to 29 minutes per day.  She exercises with enough effort to increase her heart rate 4 days per week. She is missing 1 dose(s) of medications per week.  She is not taking prescribed medications regularly due to remembering to take.               Review of Systems   Constitutional, HEENT, cardiovascular, pulmonary, GI, , musculoskeletal, neuro, skin, endocrine and psych systems are negative, except as otherwise noted.      Objective    /80   Pulse 72   Temp 96.2  F (35.7  C) (Temporal)   Resp 16   Ht 1.676 m (5' 6\")   Wt 112.3 kg (247 lb 8 oz)   LMP  (LMP Unknown)   BMI 39.95 kg/m    Body mass index is 39.95 kg/m .  Physical Exam   GENERAL: healthy, alert and no distress  EYES: Eyes grossly normal to inspection, PERRL and conjunctivae and sclerae normal  HENT: ear canals and TM's normal, nose and mouth without ulcers or lesions  NECK: no adenopathy, no asymmetry, masses, or scars and thyroid normal to palpation  RESP: lungs clear to auscultation - no rales, rhonchi or wheezes  BREAST: normal without masses, tenderness or nipple discharge and no palpable axillary masses or " adenopathy  CV: regular rate and rhythm, normal S1 S2, no S3 or S4, no murmur, click or rub, no peripheral edema and peripheral pulses strong  ABDOMEN: soft, nontender, no hepatosplenomegaly, no masses and bowel sounds normal  MS: no gross musculoskeletal defects noted, no edema  SKIN: no suspicious lesions or rashes  NEURO: Normal strength and tone, mentation intact and speech normal  PSYCH: mentation appears normal, affect normal/bright        Answers for HPI/ROS submitted by the patient on 1/6/2021   Chronic problems general questions HPI Form  If you checked off any problems, how difficult have these problems made it for you to do your work, take care of things at home, or get along with other people?: Somewhat difficult  PHQ9 TOTAL SCORE: 9  KATHLEEN 7 TOTAL SCORE: 5

## 2021-01-06 ENCOUNTER — OFFICE VISIT (OUTPATIENT)
Dept: FAMILY MEDICINE | Facility: CLINIC | Age: 61
End: 2021-01-06
Payer: COMMERCIAL

## 2021-01-06 VITALS
DIASTOLIC BLOOD PRESSURE: 80 MMHG | HEIGHT: 66 IN | SYSTOLIC BLOOD PRESSURE: 132 MMHG | TEMPERATURE: 96.2 F | RESPIRATION RATE: 16 BRPM | WEIGHT: 247.5 LBS | BODY MASS INDEX: 39.77 KG/M2 | HEART RATE: 72 BPM

## 2021-01-06 DIAGNOSIS — F41.1 ANXIETY STATE: ICD-10-CM

## 2021-01-06 DIAGNOSIS — E11.65 TYPE 2 DIABETES MELLITUS WITH HYPERGLYCEMIA, WITHOUT LONG-TERM CURRENT USE OF INSULIN (H): Primary | ICD-10-CM

## 2021-01-06 DIAGNOSIS — Z01.84 IMMUNITY STATUS TESTING: ICD-10-CM

## 2021-01-06 DIAGNOSIS — E03.4 HYPOTHYROIDISM DUE TO ACQUIRED ATROPHY OF THYROID: ICD-10-CM

## 2021-01-06 DIAGNOSIS — E66.01 MORBID OBESITY (H): ICD-10-CM

## 2021-01-06 DIAGNOSIS — Z12.31 ENCOUNTER FOR SCREENING MAMMOGRAM FOR MALIGNANT NEOPLASM OF BREAST: ICD-10-CM

## 2021-01-06 DIAGNOSIS — G43.009 MIGRAINE WITHOUT AURA AND WITHOUT STATUS MIGRAINOSUS, NOT INTRACTABLE: ICD-10-CM

## 2021-01-06 DIAGNOSIS — E78.5 HYPERLIPIDEMIA LDL GOAL <100: ICD-10-CM

## 2021-01-06 PROCEDURE — 86706 HEP B SURFACE ANTIBODY: CPT | Performed by: FAMILY MEDICINE

## 2021-01-06 PROCEDURE — 99214 OFFICE O/P EST MOD 30 MIN: CPT | Performed by: NURSE PRACTITIONER

## 2021-01-06 PROCEDURE — 83036 HEMOGLOBIN GLYCOSYLATED A1C: CPT | Performed by: FAMILY MEDICINE

## 2021-01-06 PROCEDURE — 36415 COLL VENOUS BLD VENIPUNCTURE: CPT | Performed by: FAMILY MEDICINE

## 2021-01-06 RX ORDER — LISINOPRIL 5 MG/1
5 TABLET ORAL DAILY
Qty: 90 TABLET | Refills: 0 | Status: SHIPPED | OUTPATIENT
Start: 2021-01-06 | End: 2021-04-22

## 2021-01-06 RX ORDER — SERTRALINE HYDROCHLORIDE 100 MG/1
150 TABLET, FILM COATED ORAL DAILY
Qty: 135 TABLET | Refills: 3 | Status: SHIPPED | OUTPATIENT
Start: 2021-01-06 | End: 2022-07-12

## 2021-01-06 RX ORDER — ROSUVASTATIN CALCIUM 20 MG/1
20 TABLET, COATED ORAL DAILY
Qty: 90 TABLET | Refills: 0 | Status: SHIPPED | OUTPATIENT
Start: 2021-01-06 | End: 2021-04-08

## 2021-01-06 ASSESSMENT — ANXIETY QUESTIONNAIRES
GAD7 TOTAL SCORE: 5
6. BECOMING EASILY ANNOYED OR IRRITABLE: NOT AT ALL
5. BEING SO RESTLESS THAT IT IS HARD TO SIT STILL: NOT AT ALL
7. FEELING AFRAID AS IF SOMETHING AWFUL MIGHT HAPPEN: SEVERAL DAYS
3. WORRYING TOO MUCH ABOUT DIFFERENT THINGS: SEVERAL DAYS
GAD7 TOTAL SCORE: 5
2. NOT BEING ABLE TO STOP OR CONTROL WORRYING: SEVERAL DAYS
4. TROUBLE RELAXING: SEVERAL DAYS
7. FEELING AFRAID AS IF SOMETHING AWFUL MIGHT HAPPEN: SEVERAL DAYS
1. FEELING NERVOUS, ANXIOUS, OR ON EDGE: SEVERAL DAYS
GAD7 TOTAL SCORE: 5

## 2021-01-06 ASSESSMENT — PATIENT HEALTH QUESTIONNAIRE - PHQ9
10. IF YOU CHECKED OFF ANY PROBLEMS, HOW DIFFICULT HAVE THESE PROBLEMS MADE IT FOR YOU TO DO YOUR WORK, TAKE CARE OF THINGS AT HOME, OR GET ALONG WITH OTHER PEOPLE: SOMEWHAT DIFFICULT
SUM OF ALL RESPONSES TO PHQ QUESTIONS 1-9: 9
SUM OF ALL RESPONSES TO PHQ QUESTIONS 1-9: 9

## 2021-01-06 ASSESSMENT — MIFFLIN-ST. JEOR: SCORE: 1714.4

## 2021-01-06 NOTE — PATIENT INSTRUCTIONS
Call [phone] for mammogram scheduling.         Patient Education     Shingles (Herpes Zoster)     Talk to your healthcare provider about the shingles vaccine.   Shingles is also called herpes zoster. It's a painful skin rash caused by the herpes zoster virus. This is the same virus that causes chickenpox. After a person has chickenpox, the virus stays inactive in the nerve cells. Years later, the virus can become active again and travel along the nerve to the skin. Most people have shingles only once. But it's possible to have it more than once.  Who is at risk for shingles?  Anyone who has ever had chickenpox can get shingles. But your risk is greater if you:    Are age 50 or older    Have an illness that weakens your immune system, such as HIV/AIDS    Have cancer, especially Hodgkin disease or lymphoma    Take medicines that weaken your immune system  What are the symptoms of shingles?    The first sign of shingles is often pain, burning, tingling, or itching on one part of your face or body. You may also feel as if you have the flu, with fever and chills.    A red rash with small blisters appears in a few days. The rash may look as follows:   ? The blisters can occur anywhere, but they re most common on the back, chest, or belly (abdomen).  ? They usually appear on only one side of the body, spreading along the nerve pathway where the virus is reactivating.   ? The rash can also form around an eye, along one side of the face or neck, or in the mouth.  ? In a few people, often those with a weak immune system, shingles appear on more than one part of the body at once.    After a few days, the blisters become dry and form a crust. The crust falls off in days to weeks. The blisters generally don't leave scars. But they can in severe cases. Or if someone has a weak immune system.    How is shingles treated?  For most people, shingles heals on its own in a few days or weeks. But treatment is advised to help ease  pain, speed healing, and reduce the risk of complications. Antiviral medicines are most often only prescribed if you are seen by a healthcare provider within the first 72 hours of having the rash. But antiviral medicines may be prescribed even after 72 hours if someone's immune system is weak. Or if the infection is extensive, severe, or isn't going away. To reduce symptoms:    Apply ice packs or cool compresses, or soak in a cool bath. To make an ice pack, put ice cubes in a plastic bag that seals at the top. Wrap the bag in a clean, thin towel or cloth. Never put ice or an ice pack directly on the skin.    Use calamine lotion to calm itchy skin.    Ask your provider about over-the-counter pain relievers. If your pain is severe, your provider may prescribe stronger pain medicines.  What are possible complications of shingles?  Shingles often goes away with no lasting effects. But some people have complications during or after the infection comes out:    Postherpetic neuralgia. This is the most common complication. It's more likely as people age, especially after age 60. It' is nerve pain at the place where the rash used to be. It can range from mild to severe. It can last for only a few days, or for months or even years after you have had shingles. Antiviral medicines given during the first 72 hours of the rash can reduce the chance of postherpetic neuralgia. Other medicines can be prescribed to help ease the pain and improve quality of life.    Bacterial infection. Shingles blisters may get infected with bacteria. Depending on the severity of the infection, topical, oral or IV (intravenous) antibiotic medicine is used to treat the infection.    Eye problems. If you have shingles on the face, see your healthcare provider right away. Shingles can cause serious problems with vision, and even blindness.  In very rare cases, shingles can also lead to pneumonia, hearing problems, brain inflammation, or even  death.   When to get medical care  Call your healthcare provider if you have any of these:    New symptoms or symptoms that don t go away with treatment    A rash or blisters near your eye    More drainage, fever, or rash after treatment    Severe pain that doesn t go away  How can shingles be prevented?  You can only get shingles if you have had chickenpox in the past. Someone who never had chickenpox can get the virus from you. But instead of have shingles, the person may get chickenpox. Until your blisters form scabs, don't have any contact with others, especially the following:    Pregnant women who have never had chickenpox or the vaccine    Babies who were born early (premature) or who had low weight at birth    People with weak immune systems (such as people getting chemotherapy for cancer, people who have had organ transplants, or people with HIV infections), particularly if they have never had chicken pox.  The shingles vaccine  Two shingles vaccines are available to help prevent shingles or make it less painful:    Zoster vaccine live (ZVL)    Recombinant zoster vaccine (RZV). This is a newer vaccine that has been available since 2017.  You should get the shingles vaccine if you are healthy and age 50 or older, even if you've had shingles in the past. Two shots of the RZV vaccine are recommended. You should get the second RZV shot 2 to 6 months after the first. The vaccine makes it less likely that you will develop shingles. If you do develop shingles, your symptoms will likely be milder than if you hadn t been vaccinated. RZV is also advised even if you had the older shingles vaccine in the past. That's because the RZV vaccine works better and protects you from shingles longer.  Talk with your healthcare provider about the best time to get vaccinated. Ask which vaccine is best for you based on your age and health conditions.  [employer] last reviewed this educational content on 6/1/2019 2000-2020 [employer]  [employer]. [address] [place] [address]. All rights reserved. This information is not intended as a substitute for professional medical care. Always follow your healthcare professional's instructions.           Patient Education     Pneumococcal Vaccine  Pneumococcal disease    Pneumococcal disease is caused by bacteria (Streptococcus pneumoniae). This germ is easily spread when someone with the bacteria coughs, sneezes, laughs, or talks. You can get this disease more than once. This is because there are many types (strains) of the bacteria. Some strains are also resistant to treatment with antibiotics.  There are different kinds of pneumococcal disease. What kind you have depends on what part of the body is infected. They include:    Pneumonia. This is an infection in the lungs.    Meningitis. This is an infection of the covering of the brain and spinal cord.    Otitis media. This is an infection of the middle ear.    Bacteremia or septicemia. This is an infection in the blood.  Anyone can get pneumococcal disease, but it can be life-threatening for people in high-risk groups. Thousands of people die from this disease each year. Thousands more become seriously ill.  The vaccine  Those at high risk for pneumococcal disease include:    People 65 and older    Infants younger than 2 years    People with long-term (chronic) health problems such as diabetes, lung or heart disease, liver disease, sickle cell disease, or alcoholism    People who have a cochlear implant    People who have a weakened immune system from cancer, HIV, or medicines    People who live in nursing homes or other long-term-care facilities    People who smoke or have asthma   For people at risk, the pneumococcal vaccines are the best way to keep from getting the disease. The vaccine comes in two types. The vaccine that s right for you depends on your risk factors and your age. Both vaccines are safe and work well Talk with  your healthcare provider about which vaccine is best for you and when you should get it. The vaccines are given as shots (injections). This can be done at your healthcare provider's office or a health clinic. Drugstores, senior centers, and workplaces also often offer vaccines. Ask your healthcare provider if you have questions.  They are given as follows, or as directed by your healthcare provider:    Infants to children younger than 2 years. The CDC recommends a 4-dose series given 2 to several months apart.      Ages 2 to 64 years diagnosed with certain health conditions.    Adults 19 to 64 years who are cigarette smokers or who have certain health conditions. Your healthcare provider will tell you the number of doses you need based on your health condition.    Adults 65 years and older. If you have never had a pneumococcal vaccine or don t know if you have had a vaccine, you may need 2 vaccinations a year apart. If you had a vaccination before you were 65 years old, ask your healthcare provider to tell you the number of doses you need and when you should have them.  [employer] last reviewed this educational content on 11/1/2016 2000-2020 [employer]. [address] [place] [address], [place] [address]. All rights reserved. This information is not intended as a substitute for professional medical care. Always follow your healthcare professional's instructions.

## 2021-01-07 LAB — HBA1C MFR BLD: 10.3 % (ref 0–5.6)

## 2021-01-07 ASSESSMENT — ANXIETY QUESTIONNAIRES: GAD7 TOTAL SCORE: 5

## 2021-01-07 ASSESSMENT — PATIENT HEALTH QUESTIONNAIRE - PHQ9: SUM OF ALL RESPONSES TO PHQ QUESTIONS 1-9: 9

## 2021-01-08 LAB — HBV SURFACE AB SERPL IA-ACNC: 0.65 M[IU]/ML

## 2021-01-08 NOTE — RESULT ENCOUNTER NOTE
Please advise Lia LINDSAY Lewis,  1960, that her lab results were negative Hepatitis B surface antibody is indicative of no exposure and no vaccination therefore recommend being vaccinated. HGB A1C is elevated similar to previous 10.3 I highly recommend you start the insuline as previously discussed and we recheck this level in 3 mos. Please return to clinic at that time as well for follow up diabetes management.   [phone] (home)   Thank you  Deja Lewis CNP

## 2021-01-11 ENCOUNTER — TELEPHONE (OUTPATIENT)
Dept: FAMILY MEDICINE | Facility: CLINIC | Age: 61
End: 2021-01-11

## 2021-01-11 NOTE — RESULT ENCOUNTER NOTE
Attempted to reach the patient with the following results:  Left message on voicemail for the patient to call back.   Tanya George MA

## 2021-01-11 NOTE — TELEPHONE ENCOUNTER
Attempted to reach the patient with the following results:  Left message on voicemail for the patient to call back.     Tanya George MA     When patient returns call please inform of results below per Deja Ma:   Please advise Lia Lewis,  1960, that her lab results were negative Hepatitis B surface antibody is indicative of no exposure and no vaccination therefore recommend being vaccinated. HGB A1C is elevated similar to previous 10.3 I highly recommend you start the insuline as previously discussed and we recheck this level in 3 mos. Please return to clinic at that time as well for follow up diabetes management.

## 2021-01-14 NOTE — TELEPHONE ENCOUNTER
Pt read mychart with no response will close.    Rachel Francis CMA ([facility])

## 2021-01-15 ENCOUNTER — HEALTH MAINTENANCE LETTER (OUTPATIENT)
Age: 61
End: 2021-01-15

## 2021-01-27 ENCOUNTER — TELEPHONE (OUTPATIENT)
Dept: FAMILY MEDICINE | Facility: CLINIC | Age: 61
End: 2021-01-27

## 2021-01-27 NOTE — TELEPHONE ENCOUNTER
Patient Quality Outreach Summary      Summary:    Patient is due/failing the following:   Colonoscopy, Breast Cancer Screening - Mammogram, Cervical Cancer Screening - PAP Needed and Physical     Type of outreach:    Phone, left message for patient/parent to call back.    Questions for provider review:    None                                                                                                                    Kalee Chavez CMA       Chart routed to Care Team.

## 2021-02-04 DIAGNOSIS — G43.009 MIGRAINE WITHOUT AURA AND WITHOUT STATUS MIGRAINOSUS, NOT INTRACTABLE: ICD-10-CM

## 2021-02-08 NOTE — TELEPHONE ENCOUNTER
Pending Prescriptions:                       Disp   Refills    SUMAtriptan (IMITREX) 100 MG tablet        12 tab*0        Sig: Take 1 tablet (100 mg) by mouth See Admin           Instructions WITH ONSET OF MIGRAINE, MAY REPEAT           ONCE AFTER 2 HOURS. DO NOT EXCEED 2 TABLETS IN 24           HOURS.    Routing refill request to provider for review/approval because:  Over 9 tablets in a month

## 2021-02-09 RX ORDER — SUMATRIPTAN 100 MG/1
100 TABLET, FILM COATED ORAL SEE ADMIN INSTRUCTIONS
Qty: 12 TABLET | Refills: 3 | Status: SHIPPED | OUTPATIENT
Start: 2021-02-09 | End: 2021-07-02

## 2021-03-12 NOTE — TELEPHONE ENCOUNTER
Left message for patient to call clinic. Please assist with scheduling a follow up and physical.     My chart letter was not read.    Kalee Chavez CMA

## 2021-04-05 DIAGNOSIS — E78.5 HYPERLIPIDEMIA LDL GOAL <100: ICD-10-CM

## 2021-04-08 RX ORDER — ROSUVASTATIN CALCIUM 20 MG/1
TABLET, COATED ORAL
Qty: 90 TABLET | Refills: 1 | Status: SHIPPED | OUTPATIENT
Start: 2021-04-08 | End: 2021-11-02

## 2021-04-08 NOTE — TELEPHONE ENCOUNTER
Prescription approved per [facility] Refill Protocol.    Malu Kee RN on 4/8/2021 at 2:26 PM

## 2021-04-10 ENCOUNTER — HEALTH MAINTENANCE LETTER (OUTPATIENT)
Age: 61
End: 2021-04-10

## 2021-04-21 ENCOUNTER — VIRTUAL VISIT (OUTPATIENT)
Dept: FAMILY MEDICINE | Facility: CLINIC | Age: 61
End: 2021-04-21
Payer: COMMERCIAL

## 2021-04-21 DIAGNOSIS — E11.65 TYPE 2 DIABETES MELLITUS WITH HYPERGLYCEMIA, WITHOUT LONG-TERM CURRENT USE OF INSULIN (H): Primary | Chronic | ICD-10-CM

## 2021-04-21 DIAGNOSIS — E78.5 HYPERLIPIDEMIA LDL GOAL <100: ICD-10-CM

## 2021-04-21 DIAGNOSIS — F43.0 ACUTE REACTION TO STRESS: ICD-10-CM

## 2021-04-21 PROCEDURE — 99214 OFFICE O/P EST MOD 30 MIN: CPT | Mod: 95 | Performed by: PHYSICIAN ASSISTANT

## 2021-04-21 RX ORDER — PEN NEEDLE, DIABETIC 32GX 5/32"
NEEDLE, DISPOSABLE MISCELLANEOUS
Qty: 100 EACH | Refills: 3 | Status: SHIPPED | OUTPATIENT
Start: 2021-04-21 | End: 2022-03-08

## 2021-04-21 RX ORDER — LIRAGLUTIDE 6 MG/ML
0.6 INJECTION SUBCUTANEOUS DAILY
Qty: 6 ML | Refills: 1 | Status: SHIPPED | OUTPATIENT
Start: 2021-04-21 | End: 2021-06-30

## 2021-04-21 RX ORDER — GLUCOSAMINE HCL/CHONDROITIN SU 500-400 MG
CAPSULE ORAL
Qty: 100 EACH | Refills: 3 | Status: SHIPPED | OUTPATIENT
Start: 2021-04-21 | End: 2021-11-02

## 2021-04-21 NOTE — PROGRESS NOTES
"Kesha is a 60 year old who is being evaluated via a billable telephone visit.      What phone number would you like to be contacted at?   How would you like to obtain your AVS?     Assessment & Plan     Type 2 diabetes mellitus with hyperglycemia, without long-term current use of insulin (H)  Kesha has continued to take metformin and glipizide maximum dosing  She never started the insulin.   She does not want to start it.   She is aware her a1c has not been well controlled. She understands complications of uncontrolled diabetes.  Discussed progression of disease, a1c goals, lifestyle habits  Focused discussion on med options to add to her metformin and glipizide- basal insulin, GLP1 (victoza), and SGLT2 meds. Discussed benefits, possible side effects, cost.  She states she is willing to try victoza. dicsussed possible side effects. Start 0.6mg daily x 7d, then increase to 1.2mg daily.   Labs end of this week.  Recheck in 4 weeks after starting victoza.     - HEMOGLOBIN A1C; Future  - Lipid panel reflex to direct LDL Fasting; Future  - Comprehensive metabolic panel (BMP + Alb, Alk Phos, ALT, AST, Total. Bili, TP); Future    Hyperlipidemia LDL goal <100  Due for fasting labs  She will come in Friday fasting for labs   - Lipid panel reflex to direct LDL Fasting; Future  - Comprehensive metabolic panel (BMP + Alb, Alk Phos, ALT, AST, Total. Bili, TP); Future      Follow Up: The patient was instructed to contact clinic for worsening symptoms, non-improvement as expected/discussed, and for questions regarding medications or treatment plan. Discussed parameters for follow up and included in After Visit Summary given to patient.        Return in about 4 weeks (around 5/19/2021) for Recheck.    Alicia Anaya PA-C  [facility]    Subjective   Kesha is a 60 year old who presents for the following health issues  HPI     \"I need my glipizide refilled\"    Diabetes:  Kesha had a virtual visit with myself, " "October 2020 - we discussed her a1c was 10.2% on her metformin and glipizide.  At that time we discussed insulin, GLP1 or SGLT2 options to add to her medications.  Decided on insulin and Rx sent for lantus.   She states she did not pick it up. \"I do not want to do that. I do not want to start it.\"    She then saw Deja Lewis 01- - her a1c was 10.3%.  She was advised to start insulin but again decided against it.    Since then- trying to \"do better\".  Her blood sugars are running mostly around 200 or below.   She is not exercising. I don't have any place to go to exercise. Worried if she leaves home her spouse with harm her pets. \"He's done it before\"  Getting a divorce- hasn't served spouse w/paperwork yet. Waiting to do when she leaves.  She reports she feels safe in her home and does not think he will harm her.   A lot of stress  Trying to get a mortgage approval  Working- from home  Their 26yo adult daughter lives with them as well.         Review of Systems   Constitutional, HEENT, cardiovascular, pulmonary, gi and gu systems are negative, except as otherwise noted.      Objective           Vitals:  No vitals were obtained today due to virtual visit.    Physical Exam   healthy, alert and no distress  PSYCH: Alert and oriented times 3; coherent speech, normal   rate and volume, able to articulate logical thoughts, able   to abstract reason, no tangential thoughts, no hallucinations   or delusions  Her affect is normal and pleasant  RESP: No cough, no audible wheezing, able to talk in full sentences  Remainder of exam unable to be completed due to telephone visits    Reviewed prior labs :  Component      Latest Ref Rng & Units 5/4/2020 10/29/2020 1/6/2021   Hemoglobin A1C      0 - 5.6 % 12.8 (H) 10.2 (H) 10.3 (H)                Phone call duration: 18 minutes  "

## 2021-04-21 NOTE — PATIENT INSTRUCTIONS
Continue metformin  Continue glipizide- may be able to reduce this dose.     ADD victoza- start with 0.6mg once daily for 1 week. If tolerating well, then increase to 1.2mg once daily.    Recheck in 4 weeks.   Sooner if concerns about how to use pen, side effects or other concerns       Patient Education   Using Your Victoza Pen  Medicine: Liraglutide (Xzy-v-TWEB-tide)  To see a video showing how to use the pen, visit [email or web address]  Storing your pens   Store your pens in the refrigerator until you use them. You can keep a pen at room temperature for 30 days. After that, throw it away.  Get your pen ready (before first use only):  1. Wash and dry your hands well.  2. Remove the pen cap.  3. Look at the window in the pen to make sure the liquid is clear and has no color or specks.  ? Do not use the pen if it is not clear, has a color or you can see specks in it.  ? It is normal to see air bubbles.  4. Remove the seal from the new pen needle and carefully screw it onto the end of the pen.  5. Remove the outer needle cap and set it aside. Remove the inner needle cap and throw it away.  6. Turn the knob on the pen until you see -- in the dose window.  7. Hold the pen with the needle pointing straight up. Gently tap the side of the pen to get rid of any air bubbles.  8. Push the injection button until you see 0mg in the dose window. You should see a drop of liquid at the end of the needle. This means your pen is ready to use.  9. Repeat steps 7 and 8 one or two more times if you need to, until you see a drop of medicine.  Inject your dose:  1. Wash and dry your hands well.  2. Remove the pen cap.  3. Look at the window to make sure the medicine is clear and has no color or specks.  ? Don't use the pen if it isn't clear or has a color, or you can see specks.  ? It's normal to see air bubbles.  4. Remove the seal from the new pen needle and screw it onto the end of the pen.  5. Remove the outer needle cap and  "set it aside. Remove the inner needle cap and throw it away.  6. Turn the knob away from you until you see the number for your dose in the window.  7. Use an alcohol swab to clean your skin (remember to change injection sites).  8. Insert the needle straight into your skin so that it reaches the fatty layer.  9. Use your thumb to slowly press the button on the end of the pen until you see 0mg. Hold it for 6 seconds to allow time for the medicine to get into your body.  10. Release the button and pull the needle out.  11. Put the needle cap on the end of the pen and unscrew the needle from the pen. Put the used needle into a sharps container.  12. Put the cap on the pen and store at room temperature, away from sunlight.  What to do with sharps  A sharps container is specially made to safely store injection needles and other sharp objects after they are used. You can buy a sharps container at your local pharmacy.  You can find a place to dispose of your sharps container at [email or web address].  In [place], if you don't have a sharps container, you can use a plastic laundry detergent bottle with a lid.  Make sure your container:    Can hold objects without leaking, breaking, cracking or letting the sharps push through    Has a lid that seals tightly (You should also secure the lid with tape.)    Is labeled, \"Do not recycle: Household Sharps\" and placed into the garbage.  Never place loose needles or syringes in the trash or recycling.  If you have questions about using your pen, please ask your pharmacist or doctor.    For informational purposes only. Not to replace the advice of your health care provider. Copyright   2016 [employer]. All rights reserved. Clinically reviewed by Vale Bui, PharmD, BCACP. [employer] [id number] - Rev 12/20.       Patient Education     Using Your Victoza Pen  Medicine: Liraglutide (Aay-t-FLFA-tide)  Storing your pens   Store your pens in the refrigerator until you use " them. You can keep a pen at room temperature for 30 days. After that, throw it away.  Get your pen ready (before first use only):  10. Wash and dry your hands well.  11. Remove the pen cap.  12. Look at the window in the pen to make sure the liquid is clear and has no color or specks.  ? Do not use the pen if it is not clear, has a color or you can see specks in it.  ? It is normal to see air bubbles.  13. Remove the seal from the new pen needle and carefully screw it onto the end of the pen.  14. Remove the outer needle cap and set it aside. Remove the inner needle cap and throw it away.  15. Turn the knob on the pen until you see -- in the dose window.  16. Hold the pen with the needle pointing straight up. Gently tap the side of the pen to get rid of any air bubbles.  17. Push the injection button until you see 0mg in the dose window. You should see a drop of liquid at the end of the needle. This means your pen is ready to use.  18. Repeat steps 7 and 8 one or two more times if you need to, until you see a drop of medicine.  Inject your dose:  13. Wash and dry your hands well.  14. Remove the pen cap.  15. Look at the window to make sure the medicine is clear and has no color or specks.  ? Do not use the pen if it is not clear or has a color or you can see specks.  ? It is normal to see air bubbles.  16. Remove the seal from the new pen needle and screw it onto the end of the pen.  17. Remove the outer needle cap and set it aside. Remove the inner needle cap and throw it away.  18. Turn the knob away from you until you see the number for your dose in the window.  19. Use an alcohol swab to clean your skin (remember to change injection sites).  20. Insert the needle straight into your skin so that it reaches the fatty layer.  21. Use your thumb to slowly press the button on the end of the pen until you see 0mg. Hold it for 6 seconds to allow time for the medicine to get into your body.  22. Release the button and  pull the needle out.  23. Put the needle cap on the end of the pen and unscrew the needle from the pen. Put the used needle into a SHARPS container.  24. Put the cap on the pen and store at room temperature, away from sunlight.  If you have questions about using your pen, please ask your pharmacist or doctor.  For informational purposes only. Not to replace the advice of your health care provider.   Copyright   2016 [employer] [facility]. All rights reserved. [employer] [id number] - 04/16.           Patient Education     Liraglutide Solution for injection  What is this medicine?  LIRAGLUTIDE (LIR a GLOO tide) is used to improve blood sugar control in adults with type 2 diabetes. This medicine may be used with other oral diabetes medicines.  This medicine may be used for other purposes; ask your health care provider or pharmacist if you have questions.  What should I tell my health care provider before I take this medicine?  They need to know if you have any of these conditions:    endocrine tumors (MEN 2) or if someone in your family had these tumors    gallstones    high cholesterol    history of alcohol abuse problem    history of pancreatitis    kidney disease or if you are on dialysis    liver disease    previous swelling of the tongue, face, or lips with difficulty breathing, difficulty swallowing, hoarseness, or tightening of the throat    stomach problems    thyroid cancer or if someone in your family had thyroid cancer    an unusual or allergic reaction to liraglutide, medicines, foods, dyes, or preservatives    pregnant or trying to get pregnant    breast-feeding  How should I use this medicine?  This medicine is for injection under the skin of your upper leg, stomach area, or upper arm. You will be taught how to prepare and give this medicine. Use exactly as directed. Take your medicine at regular intervals. Do not take it more often than directed.  It is important that you put your used needles and  syringes in a special sharps container. Do not put them in a trash can. If you do not have a sharps container, call your pharmacist or healthcare provider to get one.  A special MedGuide will be given to you by the pharmacist with each prescription and refill. Be sure to read this information carefully each time.  Talk to your pediatrician regarding the use of this medicine in children. Special care may be needed.  Overdosage: If you think you've taken too much of this medicine contact a poison control center or emergency room at once.  NOTE: This medicine is only for you. Do not share this medicine with others.  What if I miss a dose?  If you miss a dose, take it as soon as you can. If it is almost time for your next dose, take only that dose. Do not take double or extra doses.  What may interact with this medicine?    acetaminophen    atorvastatin    birth control pills    digoxin    griseofulvin    lisinopril  Many medications may cause changes in blood sugar, these include:    alcohol containing beverages    aspirin and aspirin-like drugs    chloramphenicol    chromium    diuretics    female hormones, such as estrogens or progestins, birth control pills    heart medicines    isoniazid    male hormones or anabolic steroids    medications for weight loss    medicines for allergies, asthma, cold, or cough    medicines for mental problems    medicines called MAO inhibitors - Nardil, Parnate, Marplan, Eldepryl    niacin    NSAIDS, such as ibuprofen    pentamidine    phenytoin    probenecid    quinolone antibiotics such as ciprofloxacin, levofloxacin, ofloxacin    some herbal dietary supplements    steroid medicines such as prednisone or cortisone    thyroid hormones  Some medications can hide the warning symptoms of low blood sugar (hypoglycemia). You may need to monitor your blood sugar more closely if you are taking one of these medications. These include:    beta-blockers, often used for high blood pressure or  heart problems (examples include atenolol, metoprolol, propranolol)    clonidine    guanethidine    reserpine  This list may not describe all possible interactions. Give your health care provider a list of all the medicines, herbs, non-prescription drugs, or dietary supplements you use. Also tell them if you smoke, drink alcohol, or use illegal drugs. Some items may interact with your medicine.  What should I watch for while using this medicine?  Visit your doctor or health care professional for regular checks on your progress.  A test called the HbA1C (A1C) will be monitored. This is a simple blood test. It measures your blood sugar control over the last 2 to 3 months. You will receive this test every 3 to 6 months.  Learn how to check your blood sugar. Learn the symptoms of low and high blood sugar and how to manage them.  Always carry a quick-source of sugar with you in case you have symptoms of low blood sugar. Examples include hard sugar candy or glucose tablets. Make sure others know that you can choke if you eat or drink when you develop serious symptoms of low blood sugar, such as seizures or unconsciousness. They must get medical help at once.  Tell your doctor or health care professional if you have high blood sugar. You might need to change the dose of your medicine. If you are sick or exercising more than usual, you might need to change the dose of your medicine.  Do not skip meals. Ask your doctor or health care professional if you should avoid alcohol. Many nonprescription cough and cold products contain sugar or alcohol. These can affect blood sugar.  Wear a medical ID bracelet or chain, and carry a card that describes your disease and details of your medicine and dosage times.  What side effects may I notice from receiving this medicine?  Side effects that you should report to your doctor or health care professional as soon as possible:    allergic reactions like skin rash, itching or hives, swelling  of the face, lips, or tongue    breathing problems    fever, chills    loss of appetite    signs and symptoms of low blood sugar such as feeling anxious, confusion, dizziness, increased hunger, unusually weak or tired, sweating, shakiness, cold, irritable, headache, blurred vision, fast heartbeat, loss of consciousness    trouble passing urine or change in the amount of urine    unusual stomach pain or upset    vomiting  Side effects that usually do not require medical attention (Report these to your doctor or health care professional if they continue or are bothersome.):    diarrhea    headache    nausea  This list may not describe all possible side effects. Call your doctor for medical advice about side effects. You may report side effects to FDA at 0-638-TDI-1502.  Where should I keep my medicine?  Keep out of the reach of children.  Store unopened pen in a refrigerator between 2 and 8 degrees C (36 and 46 degrees F). Do not freeze or use if the medicine has been frozen. Protect from light and excessive heat. After you first use the pen, it can be stored at room temperature between 15 and 30 degrees C (59 and 86 degrees F) or in a refrigerator. Throw away your used pen after 30 days or after the expiration date, whichever comes first.  Do not store your pen with the needle attached. If the needle is left on, medicine may leak from the pen.  NOTE: This sheet is a summary. It may not cover all possible information. If you have questions about this medicine, talk to your doctor, pharmacist, or health care provider.  NOTE:This sheet is a summary. It may not cover all possible information. If you have questions about this medicine, talk to your doctor, pharmacist, or health care provider. Copyright  2014 [employer]

## 2021-04-22 RX ORDER — GLIPIZIDE 10 MG/1
20 TABLET, FILM COATED, EXTENDED RELEASE ORAL DAILY
Qty: 180 TABLET | Refills: 1 | Status: SHIPPED | OUTPATIENT
Start: 2021-04-22 | End: 2021-10-28

## 2021-04-22 RX ORDER — LISINOPRIL 5 MG/1
5 TABLET ORAL DAILY
Qty: 90 TABLET | Refills: 0 | Status: SHIPPED | OUTPATIENT
Start: 2021-04-22 | End: 2021-09-01

## 2021-04-23 DIAGNOSIS — E11.65 TYPE 2 DIABETES MELLITUS WITH HYPERGLYCEMIA, WITHOUT LONG-TERM CURRENT USE OF INSULIN (H): Chronic | ICD-10-CM

## 2021-04-23 DIAGNOSIS — E78.5 HYPERLIPIDEMIA LDL GOAL <100: ICD-10-CM

## 2021-04-23 LAB
ALBUMIN SERPL-MCNC: 3.5 G/DL (ref 3.4–5)
ALP SERPL-CCNC: 86 U/L (ref 40–150)
ALT SERPL W P-5'-P-CCNC: 34 U/L (ref 0–50)
ANION GAP SERPL CALCULATED.3IONS-SCNC: 3 MMOL/L (ref 3–14)
AST SERPL W P-5'-P-CCNC: 17 U/L (ref 0–45)
BILIRUB SERPL-MCNC: 0.4 MG/DL (ref 0.2–1.3)
BUN SERPL-MCNC: 14 MG/DL (ref 7–30)
CALCIUM SERPL-MCNC: 9.1 MG/DL (ref 8.5–10.1)
CHLORIDE SERPL-SCNC: 105 MMOL/L (ref 94–109)
CHOLEST SERPL-MCNC: 131 MG/DL
CO2 SERPL-SCNC: 31 MMOL/L (ref 20–32)
CREAT SERPL-MCNC: 0.85 MG/DL (ref 0.52–1.04)
GFR SERPL CREATININE-BSD FRML MDRD: 74 ML/MIN/{1.73_M2}
GLUCOSE SERPL-MCNC: 240 MG/DL (ref 70–99)
HBA1C MFR BLD: 9.3 % (ref 0–5.6)
HDLC SERPL-MCNC: 57 MG/DL
LDLC SERPL CALC-MCNC: 53 MG/DL
NONHDLC SERPL-MCNC: 74 MG/DL
POTASSIUM SERPL-SCNC: 4 MMOL/L (ref 3.4–5.3)
PROT SERPL-MCNC: 7.4 G/DL (ref 6.8–8.8)
SODIUM SERPL-SCNC: 139 MMOL/L (ref 133–144)
TRIGL SERPL-MCNC: 106 MG/DL

## 2021-04-23 PROCEDURE — 80061 LIPID PANEL: CPT | Performed by: PHYSICIAN ASSISTANT

## 2021-04-23 PROCEDURE — 83036 HEMOGLOBIN GLYCOSYLATED A1C: CPT | Performed by: PHYSICIAN ASSISTANT

## 2021-04-23 PROCEDURE — 80053 COMPREHEN METABOLIC PANEL: CPT | Performed by: PHYSICIAN ASSISTANT

## 2021-04-23 PROCEDURE — 36415 COLL VENOUS BLD VENIPUNCTURE: CPT | Performed by: PHYSICIAN ASSISTANT

## 2021-04-30 ENCOUNTER — TELEPHONE (OUTPATIENT)
Dept: FAMILY MEDICINE | Facility: CLINIC | Age: 61
End: 2021-04-30

## 2021-04-30 NOTE — TELEPHONE ENCOUNTER
Patient Quality Outreach Summary      Summary:    Patient is due/failing the following:   Colonoscopy, Breast Cancer Screening - Mammogram, Cervical Cancer Screening - PAP Needed and Physical    Type of outreach:    Phone, spoke to patient/parent. patient will call back to schedule. patient currently has COVID.    Questions for provider review:    None                                                                                                                    Kalee Chavez CMA       Chart routed to Care Team.

## 2021-05-25 DIAGNOSIS — E03.4 HYPOTHYROIDISM DUE TO ACQUIRED ATROPHY OF THYROID: ICD-10-CM

## 2021-05-27 RX ORDER — LEVOTHYROXINE SODIUM 50 UG/1
TABLET ORAL
Qty: 90 TABLET | Refills: 0 | Status: SHIPPED | OUTPATIENT
Start: 2021-05-27 | End: 2021-09-14

## 2021-05-27 NOTE — TELEPHONE ENCOUNTER
Pending Prescriptions:                       Disp   Refills    EUTHYROX 50 MCG tablet [Pharmacy Med Name*90 tab*0            Sig: Take 1 tablet by mouth once daily    Medication is being filled for 1 time veronica refill only due to:  Patient is due for medication follow up    Please call and help schedule.  Thank you!    Malu Kee RN on 5/27/2021 at 2:42 PM

## 2021-06-07 NOTE — PATIENT INSTRUCTIONS
Prabha,    It was great seeing you in clinic today.  I summarized our discussion and your plan below.  Please let me know if you have any questions or concerns.  Please follow up with me as discussed in clinic or sooner if any worsening or additional concerns.     1. Anxiety state  59-year-old female with history of anxiety, currently well controlled on sertraline 100 mg daily.  Currently feels safe without suicidal or homicidal ideation.  Medication refilled.  - sertraline (ZOLOFT) 100 MG tablet; Take 1 tablet (100 mg) by mouth daily  Dispense: 90 tablet; Refill: 3    2. Type 2 diabetes mellitus with hyperglycemia, without long-term current use of insulin (H)  Patient's glipizide was recently increased, she has noticed improvement in her fasting morning glucose, she has also increased her exercise.  She was prescribed Victoza, however she was unable to afford the $600 per month co-pay for that.  She will follow-up with her PCP in 3 months for hemoglobin A1c retesting.  - metFORMIN (GLUCOPHAGE) 1000 MG tablet; Take 1 tablet (1,000 mg) by mouth 2 times daily (with meals)  Dispense: 180 tablet; Refill: 3    3. Migraine without aura and without status migrainosus, not intractable  She has a history of migraines, currently had a migraine this morning, she takes amitriptyline nightly for preventive, and Imitrex for abortive therapy.  This is worked for her in the past.     4. Hypothyroidism due to acquired atrophy of thyroid  She has a history of hypothyroidism, her last TSH was elevated, her PCP wants to repeat the TSH in 3 months.  Patient is tracking this and will have that done.       Sincerely,  Dr. DACIA Mcdaniel MD, FAAFP  Family Medicine Physician  [facility]  [address], [place] [address]    Patient Education      
wine

## 2021-06-27 DIAGNOSIS — G43.009 MIGRAINE WITHOUT AURA AND WITHOUT STATUS MIGRAINOSUS, NOT INTRACTABLE: ICD-10-CM

## 2021-06-28 DIAGNOSIS — E11.65 TYPE 2 DIABETES MELLITUS WITH HYPERGLYCEMIA, WITHOUT LONG-TERM CURRENT USE OF INSULIN (H): ICD-10-CM

## 2021-06-29 NOTE — TELEPHONE ENCOUNTER
Pending Prescriptions:                       Disp   Refills    metFORMIN (GLUCOPHAGE) 1000 MG tablet [Ph*60 tab*0            Sig: TAKE 1 TABLET BY MOUTH TWICE DAILY WITH MEALS    Medication is being filled for 1 time veronica refill only due to:  Patient is due for DM check.     Please call and help schedule.  Thank you!    Arpan Cárdenas RN, BSN  [facility] [place]  June 29, 2021

## 2021-06-30 DIAGNOSIS — E11.65 TYPE 2 DIABETES MELLITUS WITH HYPERGLYCEMIA, WITHOUT LONG-TERM CURRENT USE OF INSULIN (H): Chronic | ICD-10-CM

## 2021-06-30 RX ORDER — LIRAGLUTIDE 6 MG/ML
INJECTION SUBCUTANEOUS
Qty: 6 ML | Refills: 1 | Status: SHIPPED | OUTPATIENT
Start: 2021-06-30 | End: 2021-09-02

## 2021-06-30 NOTE — PROGRESS NOTES
"Kesha is a 60 year old who is being evaluated via a billable video visit.      How would you like to obtain your AVS? MyChart     If the video visit is dropped, the invitation should be resent by: Text to cell phone: [phone]     Will anyone else be joining your video visit? No         Video Start Time: 7:33 AM      Assessment & Plan     Type 2 diabetes mellitus with hyperglycemia, without long-term current use of insulin (H)  Kesha reporting improving home blood sugar readings since starting victoza. Eating smaller portions due to side effects of lower appetite. Otherwise is tolerating the victoza.   She has also continued on metformin and glipizide.   She will come to clinic to recheck an a1c on Monday 07/05/2021.   Other labs are up to date  She needs to reschedule her eye exam- and plans to do so still for summer.   Next visit should be face-to-face in person   - Hemoglobin A1c; Future    Acute reaction to stress  Pt feels she is doing well overall on her sertraline and amitriptyline.   She has situational factors that are transient currently but is looking forward to upcoming changes.  No med changes at this time  Self cares.     Anxiety state  As above. Continue current meds     Migraine without aura and without status migrainosus, not intractable  She is requesting refills of imitrex , reviewed had rx sent Feb 2021 for 12 tabs w/3 refills. She does not think she has used all these refiills and will contact her pharmacy.  Discussed medications, max dosing and follow up. Migraine pattern is stable. Meds refilled. Discussed migraine dx, when to seek care for change in frequency, intensity, and warning signs of changing features of migraine pattern.         BMI:   Estimated body mass index is 39.95 kg/m  as calculated from the following:    Height as of 1/6/21: 1.676 m (5' 6\").    Weight as of 1/6/21: 112.3 kg (247 lb 8 oz).   Weight management plan: Discussed healthy diet and exercise guidelines    Depression " "Screening Follow Up    PHQ 7/1/2021   PHQ-9 Total Score 13   Q9: Thoughts of better off dead/self-harm past 2 weeks Not at all          Follow Up: The patient was instructed to contact clinic for worsening symptoms, non-improvement in time frame as expected/discussed, and for questions regarding medications or treatment plan. For virtual visits, the patient was advised to be seen for in person evaluation if symptoms or condition are worsening or non-improvement as expected.       Return in about 6 weeks (around 8/12/2021) for Physical Exam.    Alicia Anaya PA-C  [facility] EVANGELIST Rebolledo is a 60 year old who presents for the following health issues .    HPI     Diabetes Follow-up- last visit April 2021    She is on metformin and glipizide- in April added victoza   Am fasting blood sugars running ~ 130  Infrequently checks before dinner 100  Stomach queasy at first and lower appetite. So eating smaller portions. No vomiting. No pain.   Had eye exam scheduled but had to cancel. She states will reschedule but has not done it yet.    How often are you checking your blood sugar? Two times daily  Blood sugar testing frequency justification:  Uncontrolled diabetes  What time of day are you checking your blood sugars (select all that apply)?  Before meals  Have you had any blood sugars above 200?  No  Have you had any blood sugars below 70?  No    What symptoms do you notice when your blood sugar is low?  \"removed\"    What concerns do you have today about your diabetes? None     Do you have any of these symptoms? (Select all that apply)  No numbness or tingling in feet.  No redness, sores or blisters on feet.  No complaints of excessive thirst.  No reports of blurry vision.  No significant changes to weight.    Have you had a diabetic eye exam in the last 12 months? No    Migraines  Needing imitrex.  Used more lately- 3x in past 2 weeks. Attributes to weather changes- heat, storms.   She " "denies side effects  She denies change in pain pattern or features. Stable.     Hyperlipidemia Follow-Up    Are you regularly taking any medication or supplement to lower your cholesterol?   Yes- Crestor    Are you having muscle aches or other side effects that you think could be caused by your cholesterol lowering medication?  No    Hypertension Follow-up    Do you check your blood pressure regularly outside of the clinic? No     Are you following a low salt diet? Yes    Are your blood pressures ever more than 140 on the top number (systolic) OR more   than 90 on the bottom number (diastolic), for example 140/90? No    BP Readings from Last 2 Encounters:   21 132/80   10/15/07 110/80     Hemoglobin A1C (%)   Date Value   2021 9.3 (H)   2021 10.3 (H)     LDL Cholesterol Calculated (mg/dL)   Date Value   2021 53   2020 44       Anxiety Follow-Up    How are you doing with your anxiety since your last visit? stable    Are you having other symptoms that might be associated with anxiety? No    Have you had a significant life event? Relationship Concerns and Housing Concerns     Are you feeling depressed? No    Do you have any concerns with your use of alcohol or other drugs? No    Closing on new house end of July- she and daughter are moving together. Divorce papers ready to get sent- spouse does not know this is in process- he will be served when she moves into her new home. She states she is safe and is not worried about her physical health - just doesn't want to deal with \"his hissy fits\" while they are living together.      Social History     Tobacco Use     Smoking status: Former Smoker     Quit date: 1996     Years since quittin.5     Smokeless tobacco: Never Used     Tobacco comment: no smokers in household   Substance Use Topics     Alcohol use: Yes     Comment: ashok and crm x 1-2/2-3x per yr     Drug use: No     KATHLEEN-7 SCORE 10/26/2020 2021 2021   Total Score - 5 " (mild anxiety) -   Total Score 8 5 7     PHQ 10/26/2020 1/6/2021 7/1/2021   PHQ-9 Total Score 8 9 13   Q9: Thoughts of better off dead/self-harm past 2 weeks Not at all Not at all Not at all         How many servings of fruits and vegetables do you eat daily?  2-3    On average, how many sweetened beverages do you drink each day (Examples: soda, juice, sweet tea, etc.  Do NOT count diet or artificially sweetened beverages)?   0    How many days per week do you exercise enough to make your heart beat faster? 3 or less    How many minutes a day do you exercise enough to make your heart beat faster? 9 or less    How many days per week do you miss taking your medication? 0        Review of Systems   Constitutional, HEENT, cardiovascular, pulmonary, gi and gu systems are negative, except as otherwise noted.      Objective           Vitals:  No vitals were obtained today due to virtual visit.    Physical Exam   GENERAL: Healthy, alert and no distress  EYES: Eyes grossly normal to inspection.  No discharge or erythema, or obvious scleral/conjunctival abnormalities.  HENT: Normal cephalic/atraumatic.  External ears, nose and mouth without ulcers or lesions.  No nasal drainage visible.  RESP: No audible wheeze, cough, or visible cyanosis.  No visible retractions or increased work of breathing.    SKIN: Visible skin clear. No significant rash, abnormal pigmentation or lesions.  NEURO: Cranial nerves grossly intact.  Mentation and speech appropriate for age.  PSYCH: Mentation appears normal, affect normal/bright, judgement and insight intact, normal speech and appearance well-groomed.              Video-Visit Details    Type of service:  Video Visit    Video End Time:7:47 AM    Originating Location (pt. Location): Home    Distant Location (provider location):  [facility]     Platform used for Video Visit: ResearchGate

## 2021-06-30 NOTE — TELEPHONE ENCOUNTER
Prescription approved per [facility] Refill Protocol.    Malu Kee RN on 6/30/2021 at 1:23 PM

## 2021-07-01 ENCOUNTER — TELEPHONE (OUTPATIENT)
Dept: FAMILY MEDICINE | Facility: CLINIC | Age: 61
End: 2021-07-01

## 2021-07-01 ENCOUNTER — VIRTUAL VISIT (OUTPATIENT)
Dept: FAMILY MEDICINE | Facility: CLINIC | Age: 61
End: 2021-07-01
Payer: COMMERCIAL

## 2021-07-01 DIAGNOSIS — G43.009 MIGRAINE WITHOUT AURA AND WITHOUT STATUS MIGRAINOSUS, NOT INTRACTABLE: ICD-10-CM

## 2021-07-01 DIAGNOSIS — E11.65 TYPE 2 DIABETES MELLITUS WITH HYPERGLYCEMIA, WITHOUT LONG-TERM CURRENT USE OF INSULIN (H): Primary | Chronic | ICD-10-CM

## 2021-07-01 DIAGNOSIS — F41.1 ANXIETY STATE: ICD-10-CM

## 2021-07-01 DIAGNOSIS — F43.0 ACUTE REACTION TO STRESS: ICD-10-CM

## 2021-07-01 PROCEDURE — 99214 OFFICE O/P EST MOD 30 MIN: CPT | Mod: 95 | Performed by: PHYSICIAN ASSISTANT

## 2021-07-01 ASSESSMENT — ANXIETY QUESTIONNAIRES
6. BECOMING EASILY ANNOYED OR IRRITABLE: NOT AT ALL
3. WORRYING TOO MUCH ABOUT DIFFERENT THINGS: SEVERAL DAYS
7. FEELING AFRAID AS IF SOMETHING AWFUL MIGHT HAPPEN: SEVERAL DAYS
5. BEING SO RESTLESS THAT IT IS HARD TO SIT STILL: SEVERAL DAYS
2. NOT BEING ABLE TO STOP OR CONTROL WORRYING: SEVERAL DAYS
GAD7 TOTAL SCORE: 7
1. FEELING NERVOUS, ANXIOUS, OR ON EDGE: MORE THAN HALF THE DAYS
IF YOU CHECKED OFF ANY PROBLEMS ON THIS QUESTIONNAIRE, HOW DIFFICULT HAVE THESE PROBLEMS MADE IT FOR YOU TO DO YOUR WORK, TAKE CARE OF THINGS AT HOME, OR GET ALONG WITH OTHER PEOPLE: SOMEWHAT DIFFICULT

## 2021-07-01 ASSESSMENT — PATIENT HEALTH QUESTIONNAIRE - PHQ9
SUM OF ALL RESPONSES TO PHQ QUESTIONS 1-9: 13
5. POOR APPETITE OR OVEREATING: SEVERAL DAYS

## 2021-07-01 NOTE — TELEPHONE ENCOUNTER
Called and talked to patient. She had a vitural appt with Alicia Anaya and she needs to schedule the following:     -Labs  -Physical    Patient will call back once she knows more about her schedule with work and life.     Marquis Pinedo MA on 7/1/2021 at 8:04 AM

## 2021-07-01 NOTE — PATIENT INSTRUCTIONS
Please plan for labs Monday 07/05/2021    Plan for eye exam still this summer    Plan for in person visit in clinic for physical and pap

## 2021-07-02 RX ORDER — SUMATRIPTAN 100 MG/1
TABLET, FILM COATED ORAL
Qty: 12 TABLET | Refills: 0 | Status: SHIPPED | OUTPATIENT
Start: 2021-07-02 | End: 2021-09-01

## 2021-07-02 ASSESSMENT — ANXIETY QUESTIONNAIRES: GAD7 TOTAL SCORE: 7

## 2021-07-02 NOTE — TELEPHONE ENCOUNTER
Pending Prescriptions:                       Disp   Refills    SUMAtriptan (IMITREX) 100 MG tablet [Phar*12 tab*0            Sig: TAKE ONE TABLET BY MOUTH WITH ONSET OF MIGRAINE.           MAY REPEAT ONCE AFTER 2 HOURS. DO NOT EXCEED 2           TABLETS IN 24 HOURS    Medication is being filled for 1 time veronica refill only due to:  Patient is due for medication follow up    Please call and help schedule.  Thank you!    Malu Kee RN on 7/2/2021 at 4:51 PM

## 2021-07-25 ENCOUNTER — HEALTH MAINTENANCE LETTER (OUTPATIENT)
Age: 61
End: 2021-07-25

## 2021-08-26 ENCOUNTER — MYC MEDICAL ADVICE (OUTPATIENT)
Dept: FAMILY MEDICINE | Facility: CLINIC | Age: 61
End: 2021-08-26

## 2021-08-26 DIAGNOSIS — E11.65 TYPE 2 DIABETES MELLITUS WITH HYPERGLYCEMIA, WITHOUT LONG-TERM CURRENT USE OF INSULIN (H): Chronic | ICD-10-CM

## 2021-08-30 ENCOUNTER — LAB (OUTPATIENT)
Dept: LAB | Facility: CLINIC | Age: 61
End: 2021-08-30
Payer: COMMERCIAL

## 2021-08-30 DIAGNOSIS — E11.65 TYPE 2 DIABETES MELLITUS WITH HYPERGLYCEMIA, WITHOUT LONG-TERM CURRENT USE OF INSULIN (H): ICD-10-CM

## 2021-08-30 DIAGNOSIS — E11.65 TYPE 2 DIABETES MELLITUS WITH HYPERGLYCEMIA, WITHOUT LONG-TERM CURRENT USE OF INSULIN (H): Chronic | ICD-10-CM

## 2021-08-30 LAB — HBA1C MFR BLD: 6 % (ref 0–5.6)

## 2021-08-30 PROCEDURE — 36415 COLL VENOUS BLD VENIPUNCTURE: CPT

## 2021-08-30 PROCEDURE — 83036 HEMOGLOBIN GLYCOSYLATED A1C: CPT

## 2021-09-01 ENCOUNTER — MYC MEDICAL ADVICE (OUTPATIENT)
Dept: FAMILY MEDICINE | Facility: CLINIC | Age: 61
End: 2021-09-01

## 2021-09-01 ENCOUNTER — MYC REFILL (OUTPATIENT)
Dept: FAMILY MEDICINE | Facility: CLINIC | Age: 61
End: 2021-09-01

## 2021-09-01 ENCOUNTER — MYC REFILL (OUTPATIENT)
Dept: FAMILY MEDICINE | Facility: OTHER | Age: 61
End: 2021-09-01

## 2021-09-01 DIAGNOSIS — E11.65 TYPE 2 DIABETES MELLITUS WITH HYPERGLYCEMIA, WITHOUT LONG-TERM CURRENT USE OF INSULIN (H): Chronic | ICD-10-CM

## 2021-09-01 DIAGNOSIS — G43.009 MIGRAINE WITHOUT AURA AND WITHOUT STATUS MIGRAINOSUS, NOT INTRACTABLE: ICD-10-CM

## 2021-09-01 DIAGNOSIS — E11.65 TYPE 2 DIABETES MELLITUS WITH HYPERGLYCEMIA, WITHOUT LONG-TERM CURRENT USE OF INSULIN (H): ICD-10-CM

## 2021-09-02 RX ORDER — SUMATRIPTAN 100 MG/1
100 TABLET, FILM COATED ORAL
Qty: 12 TABLET | Refills: 1 | Status: SHIPPED | OUTPATIENT
Start: 2021-09-02 | End: 2021-11-02

## 2021-09-02 RX ORDER — LIRAGLUTIDE 6 MG/ML
INJECTION SUBCUTANEOUS
Qty: 6 ML | Refills: 1 | Status: SHIPPED | OUTPATIENT
Start: 2021-09-02 | End: 2021-11-02

## 2021-09-02 RX ORDER — LISINOPRIL 5 MG/1
5 TABLET ORAL DAILY
Qty: 90 TABLET | Refills: 1 | Status: SHIPPED | OUTPATIENT
Start: 2021-09-02 | End: 2021-11-02

## 2021-09-02 NOTE — TELEPHONE ENCOUNTER
Prescription approved per [facility] Refill Protocol.    Malu Kee RN on 9/2/2021 at 7:58 AM

## 2021-09-02 NOTE — TELEPHONE ENCOUNTER
Pending Prescriptions:                       Disp   Refills    metFORMIN (GLUCOPHAGE) 1000 MG tablet     180 ta*0            Sig: Take 1 tablet (1,000 mg) by mouth 2 times daily           (with meals)    Medication is being filled for 1 time veronica refill only due to:  Patient is due for physical    Please call and help schedule.  Thank you!    Malu Kee RN on 9/2/2021 at 3:20 PM

## 2021-09-02 NOTE — TELEPHONE ENCOUNTER
Prescription approved per [facility] Refill Protocol.    Malu Kee RN on 9/2/2021 at 7:59 AM

## 2021-09-02 NOTE — TELEPHONE ENCOUNTER
Prescription approved per [facility] Refill Protocol.    Malu Kee RN on 9/2/2021 at 7:57 AM

## 2021-09-14 DIAGNOSIS — E03.4 HYPOTHYROIDISM DUE TO ACQUIRED ATROPHY OF THYROID: ICD-10-CM

## 2021-09-14 RX ORDER — LEVOTHYROXINE SODIUM 50 UG/1
TABLET ORAL
Qty: 90 TABLET | Refills: 0 | Status: SHIPPED | OUTPATIENT
Start: 2021-09-14 | End: 2021-11-02

## 2021-09-14 NOTE — TELEPHONE ENCOUNTER
Pending Prescriptions:                       Disp   Refills    EUTHYROX 50 MCG tablet [Pharmacy Med Name*90 tab*0            Sig: Take 1 tablet by mouth once daily    Medication is being filled for 1 time veronica refill only due to:  Patient is due for medication follow up    Please call and help schedule.  Thank you!    Malu Kee RN on 9/14/2021 at 2:35 PM

## 2021-09-19 ENCOUNTER — HEALTH MAINTENANCE LETTER (OUTPATIENT)
Age: 61
End: 2021-09-19

## 2021-10-18 DIAGNOSIS — F41.1 ANXIETY STATE: ICD-10-CM

## 2021-10-19 ENCOUNTER — IMMUNIZATION (OUTPATIENT)
Dept: FAMILY MEDICINE | Facility: CLINIC | Age: 61
End: 2021-10-19
Payer: COMMERCIAL

## 2021-10-19 DIAGNOSIS — Z23 NEED FOR PROPHYLACTIC VACCINATION AND INOCULATION AGAINST INFLUENZA: Primary | ICD-10-CM

## 2021-10-19 PROCEDURE — 90471 IMMUNIZATION ADMIN: CPT

## 2021-10-19 PROCEDURE — 99207 PR NO CHARGE NURSE ONLY: CPT

## 2021-10-19 PROCEDURE — 90682 RIV4 VACC RECOMBINANT DNA IM: CPT

## 2021-10-20 ENCOUNTER — MYC MEDICAL ADVICE (OUTPATIENT)
Dept: FAMILY MEDICINE | Facility: CLINIC | Age: 61
End: 2021-10-20

## 2021-10-20 RX ORDER — AMITRIPTYLINE HYDROCHLORIDE 100 MG/1
TABLET ORAL
Qty: 60 TABLET | Refills: 0 | Status: SHIPPED | OUTPATIENT
Start: 2021-10-20 | End: 2021-11-02

## 2021-10-20 NOTE — TELEPHONE ENCOUNTER
"Requested Prescriptions   Pending Prescriptions Disp Refills     amitriptyline (ELAVIL) 100 MG tablet [Pharmacy Med Name: Amitriptyline HCl 100 MG Oral Tablet] 60 tablet 0     Sig: TAKE 1 TABLET BY MOUTH AT BEDTIME . APPOINTMENT REQUIRED FOR FUTURE REFILLS       Tricyclic Agents ( Annual appt and no PHQ9) Passed - 10/18/2021  7:45 AM        Passed - Blood Pressure under 140/90 in past 12 mos     BP Readings from Last 3 Encounters:   01/06/21 132/80   10/15/07 110/80             Passed - Recent (12 mo) or future (30 days) visit within authorizing provider's specialty     Patient has had an office visit with the authorizing provider or a provider within the authorizing providers department within the previous 12 mos or has a future within next 30 days. See \"Patient Info\" tab in inbasket, or \"Choose Columns\" in Meds & Orders section of the refill encounter.              Passed - Medication is active on med list        Passed - Patient is age 18 or older        Passed - Patient is not pregnant        Passed - No positive pregnancy test on record in past 12 mos           Prescription approved per [facility] Refill Protocol.    Carlo West, RN, BSN    "

## 2021-10-27 DIAGNOSIS — E11.65 TYPE 2 DIABETES MELLITUS WITH HYPERGLYCEMIA, WITHOUT LONG-TERM CURRENT USE OF INSULIN (H): Chronic | ICD-10-CM

## 2021-10-28 RX ORDER — GLIPIZIDE 10 MG/1
TABLET, FILM COATED, EXTENDED RELEASE ORAL
Qty: 180 TABLET | Refills: 0 | Status: SHIPPED | OUTPATIENT
Start: 2021-10-28 | End: 2021-11-02

## 2021-10-28 NOTE — TELEPHONE ENCOUNTER
Pending Prescriptions:                       Disp   Refills    glipiZIDE (GLUCOTROL XL) 10 MG 24 hr tabl*180 ta*0            Sig: Take 2 tablets by mouth once daily    Medication is being filled for 1 time veronica refill only due to:  Patient is due for medication follow up    Please call and help schedule.  Thank you!    Malu Kee RN on 10/28/2021 at 3:38 PM

## 2021-11-01 DIAGNOSIS — E11.65 TYPE 2 DIABETES MELLITUS WITH HYPERGLYCEMIA, WITHOUT LONG-TERM CURRENT USE OF INSULIN (H): Chronic | ICD-10-CM

## 2021-11-02 ENCOUNTER — OFFICE VISIT (OUTPATIENT)
Dept: FAMILY MEDICINE | Facility: CLINIC | Age: 61
End: 2021-11-02
Payer: COMMERCIAL

## 2021-11-02 VITALS
TEMPERATURE: 98.1 F | HEIGHT: 66 IN | HEART RATE: 90 BPM | WEIGHT: 238 LBS | OXYGEN SATURATION: 97 % | BODY MASS INDEX: 38.25 KG/M2 | DIASTOLIC BLOOD PRESSURE: 74 MMHG | SYSTOLIC BLOOD PRESSURE: 126 MMHG

## 2021-11-02 DIAGNOSIS — F41.1 ANXIETY STATE: ICD-10-CM

## 2021-11-02 DIAGNOSIS — Z12.11 SCREEN FOR COLON CANCER: ICD-10-CM

## 2021-11-02 DIAGNOSIS — Z11.4 SCREENING FOR HIV (HUMAN IMMUNODEFICIENCY VIRUS): ICD-10-CM

## 2021-11-02 DIAGNOSIS — E03.4 HYPOTHYROIDISM DUE TO ACQUIRED ATROPHY OF THYROID: ICD-10-CM

## 2021-11-02 DIAGNOSIS — E11.65 TYPE 2 DIABETES MELLITUS WITH HYPERGLYCEMIA, WITHOUT LONG-TERM CURRENT USE OF INSULIN (H): Primary | Chronic | ICD-10-CM

## 2021-11-02 DIAGNOSIS — G43.009 MIGRAINE WITHOUT AURA AND WITHOUT STATUS MIGRAINOSUS, NOT INTRACTABLE: ICD-10-CM

## 2021-11-02 DIAGNOSIS — Z12.31 ENCOUNTER FOR SCREENING MAMMOGRAM FOR BREAST CANCER: ICD-10-CM

## 2021-11-02 DIAGNOSIS — Z11.59 NEED FOR HEPATITIS C SCREENING TEST: ICD-10-CM

## 2021-11-02 DIAGNOSIS — E78.5 HYPERLIPIDEMIA LDL GOAL <100: ICD-10-CM

## 2021-11-02 PROCEDURE — 84443 ASSAY THYROID STIM HORMONE: CPT | Performed by: NURSE PRACTITIONER

## 2021-11-02 PROCEDURE — 99214 OFFICE O/P EST MOD 30 MIN: CPT | Performed by: NURSE PRACTITIONER

## 2021-11-02 PROCEDURE — 86803 HEPATITIS C AB TEST: CPT | Performed by: NURSE PRACTITIONER

## 2021-11-02 PROCEDURE — 82043 UR ALBUMIN QUANTITATIVE: CPT | Performed by: NURSE PRACTITIONER

## 2021-11-02 PROCEDURE — 87389 HIV-1 AG W/HIV-1&-2 AB AG IA: CPT | Performed by: NURSE PRACTITIONER

## 2021-11-02 PROCEDURE — 36415 COLL VENOUS BLD VENIPUNCTURE: CPT | Performed by: NURSE PRACTITIONER

## 2021-11-02 RX ORDER — SUMATRIPTAN 100 MG/1
100 TABLET, FILM COATED ORAL
Qty: 12 TABLET | Refills: 5 | Status: SHIPPED | OUTPATIENT
Start: 2021-11-02 | End: 2022-09-12

## 2021-11-02 RX ORDER — LANCETS
EACH MISCELLANEOUS
Qty: 100 EACH | Refills: 6 | Status: SHIPPED | OUTPATIENT
Start: 2021-11-02

## 2021-11-02 RX ORDER — ROSUVASTATIN CALCIUM 20 MG/1
20 TABLET, COATED ORAL DAILY
Qty: 90 TABLET | Refills: 3 | Status: SHIPPED | OUTPATIENT
Start: 2021-11-02 | End: 2022-09-16

## 2021-11-02 RX ORDER — GLIPIZIDE 10 MG/1
10 TABLET, FILM COATED, EXTENDED RELEASE ORAL DAILY
Qty: 90 TABLET | Refills: 1 | Status: SHIPPED | OUTPATIENT
Start: 2021-11-02 | End: 2022-03-08

## 2021-11-02 RX ORDER — LEVOTHYROXINE SODIUM 50 UG/1
50 TABLET ORAL DAILY
Qty: 90 TABLET | Refills: 3 | Status: SHIPPED | OUTPATIENT
Start: 2021-11-02 | End: 2022-09-16

## 2021-11-02 RX ORDER — GLIPIZIDE 5 MG/1
5 TABLET, FILM COATED, EXTENDED RELEASE ORAL DAILY
Qty: 90 TABLET | Refills: 1 | Status: SHIPPED | OUTPATIENT
Start: 2021-11-02 | End: 2022-03-08

## 2021-11-02 RX ORDER — LIRAGLUTIDE 6 MG/ML
1.2 INJECTION SUBCUTANEOUS DAILY
Qty: 18 ML | Refills: 1 | Status: SHIPPED | OUTPATIENT
Start: 2021-11-02 | End: 2022-03-08

## 2021-11-02 RX ORDER — LISINOPRIL 5 MG/1
5 TABLET ORAL DAILY
Qty: 90 TABLET | Refills: 1 | Status: SHIPPED | OUTPATIENT
Start: 2021-11-02 | End: 2022-03-08

## 2021-11-02 RX ORDER — AMITRIPTYLINE HYDROCHLORIDE 100 MG/1
TABLET ORAL
Qty: 90 TABLET | Refills: 3 | Status: SHIPPED | OUTPATIENT
Start: 2021-11-02 | End: 2022-09-16

## 2021-11-02 ASSESSMENT — MIFFLIN-ST. JEOR: SCORE: 1666.31

## 2021-11-02 ASSESSMENT — PAIN SCALES - GENERAL: PAINLEVEL: NO PAIN (0)

## 2021-11-02 NOTE — PROGRESS NOTES
Assessment & Plan     Screen for colon cancer    - ANDI(EXACT SCIENCES)    Screening for HIV (human immunodeficiency virus)    - HIV Antigen Antibody Combo; Future    Need for hepatitis C screening test    - Hepatitis C Screen Reflex to HCV RNA Quant and Genotype; Future    Type 2 diabetes mellitus with hyperglycemia, without long-term current use of insulin (H)  Patient to decrease glipizide to help prevent low blood sugars and aide in weight loss.  - Adult Eye Referral; Future  - glipiZIDE (GLUCOTROL XL) 10 MG 24 hr tablet; Take 1 tablet (10 mg) by mouth daily  - glipiZIDE (GLUCOTROL XL) 5 MG 24 hr tablet; Take 1 tablet (5 mg) by mouth daily  - metFORMIN (GLUCOPHAGE) 1000 MG tablet; Take 1 tablet (1,000 mg) by mouth 2 times daily (with meals)  - lisinopril (ZESTRIL) 5 MG tablet; Take 1 tablet (5 mg) by mouth daily  - blood glucose (NO BRAND SPECIFIED) test strip; Use to test blood sugar 1-2 times daily or as directed. To accompany: Blood Glucose Monitor Brands: per insurance.  - thin (NO BRAND SPECIFIED) lancets; Use with lanceting device. To accompany: Blood Glucose Monitor Brands: per insurance.  - liraglutide (VICTOZA PEN) 18 MG/3ML solution; Inject 1.2 mg Subcutaneous daily  - Albumin Random Urine Quantitative with Creat Ratio; Future    Migraine without aura and without status migrainosus, not intractable  Stable.  Continue current treatment plan and medications.   - SUMAtriptan (IMITREX) 100 MG tablet; Take 1 tablet (100 mg) by mouth at onset of headache for migraine MAY REPEAT ONCE AFTER 2 HOURS. DO NOT EXCEED 2 TABLETS IN 24 HOURS    Anxiety state  Stable.  Continue current treatment plan and medications.   - amitriptyline (ELAVIL) 100 MG tablet; Take 1 tablet by mouth at bedtime    Hypothyroidism due to acquired atrophy of thyroid  Stable.  Continue current treatment plan and medications.   - TSH with free T4 reflex; Future  - levothyroxine (EUTHYROX) 50 MCG tablet; Take 1 tablet (50 mcg) by mouth  daily    Hyperlipidemia LDL goal <100  Stable.  Continue current treatment plan and medications.   - rosuvastatin (CRESTOR) 20 MG tablet; Take 1 tablet (20 mg) by mouth daily    Encounter for screening mammogram for breast cancer    - *MA Screening Digital Bilateral; Future    Ordering of each unique test  Prescription drug management             Return in about 3 months (around 2/2/2022) for Diabetic Check.    MANUELA Shepherd CNP  [facility] YUMIKO Rebolledo is a 61 year old who presents for the following health issues     HPI     Diabetes Follow-up    How often are you checking your blood sugar? One time daily  What time of day are you checking your blood sugars (select all that apply)?  Before meals  Have you had any blood sugars above 200?  No  Have you had any blood sugars below 70?  No    What symptoms do you notice when your blood sugar is low?  Lightheadness     What concerns do you have today about your diabetes? None     Do you have any of these symptoms? (Select all that apply)  No numbness or tingling in feet.  No redness, sores or blisters on feet.  No complaints of excessive thirst.  No reports of blurry vision.  No significant changes to weight.    Have you had a diabetic eye exam in the last 12 months? No    Blood sugars consistently in the 90's.            Hyperlipidemia Follow-Up      Are you regularly taking any medication or supplement to lower your cholesterol?   Yes- rosuvastatin (CRESTOR) 20 MG tablet    Are you having muscle aches or other side effects that you think could be caused by your cholesterol lowering medication?  No    Hypertension Follow-up      Do you check your blood pressure regularly outside of the clinic? No     Are you following a low salt diet? Yes    Are your blood pressures ever more than 140 on the top number (systolic) OR more   than 90 on the bottom number (diastolic), for example 140/90? No    BP Readings from Last 2 Encounters:  "  11/02/21 126/74   01/06/21 132/80     Hemoglobin A1C (%)   Date Value   08/30/2021 6.0 (H)   04/23/2021 9.3 (H)   01/06/2021 10.3 (H)     LDL Cholesterol Calculated (mg/dL)   Date Value   04/23/2021 53   05/04/2020 44       Hypothyroidism Follow-up      Since last visit, patient describes the following symptoms: Weight stable, no hair loss, no skin changes, no constipation, no loose stools and hair loss      How many servings of fruits and vegetables do you eat daily?  2-3    On average, how many sweetened beverages do you drink each day (Examples: soda, juice, sweet tea, etc.  Do NOT count diet or artificially sweetened beverages)?   0    How many days per week do you exercise enough to make your heart beat faster? none    How many minutes a day do you exercise enough to make your heart beat faster? none    How many days per week do you miss taking your medication? 0    Patient recently moved to [place] following a divorce.  She notes that she has been very active with moving.    Review of Systems   Constitutional, HEENT, cardiovascular, pulmonary, gi and gu systems are negative, except as otherwise noted.      Objective    /74   Pulse 90   Temp 98.1  F (36.7  C) (Oral)   Ht 1.676 m (5' 6\")   Wt 108 kg (238 lb)   SpO2 97%   BMI 38.41 kg/m    Body mass index is 38.41 kg/m .  Physical Exam   GENERAL: healthy, alert and no distress  RESP: lungs clear to auscultation - no rales, rhonchi or wheezes  CV: regular rate and rhythm, normal S1 S2, no S3 or S4, no murmur, click or rub, no peripheral edema and peripheral pulses strong  MS: no gross musculoskeletal defects noted, no edema  Diabetic foot exam: normal DP and PT pulses, no trophic changes or ulcerative lesions and normal sensory exam                  "

## 2021-11-03 LAB
CREAT UR-MCNC: 117 MG/DL
HCV AB SERPL QL IA: NONREACTIVE
HIV 1+2 AB+HIV1 P24 AG SERPL QL IA: NONREACTIVE
MICROALBUMIN UR-MCNC: 8 MG/L
MICROALBUMIN/CREAT UR: 6.84 MG/G CR (ref 0–25)
TSH SERPL DL<=0.005 MIU/L-ACNC: 2.43 MU/L (ref 0.4–4)

## 2021-11-03 RX ORDER — LIRAGLUTIDE 6 MG/ML
INJECTION SUBCUTANEOUS
Qty: 6 ML | Refills: 0 | OUTPATIENT
Start: 2021-11-03

## 2021-11-03 NOTE — RESULT ENCOUNTER NOTE
Dear Lia,    Your recent test results are attached.      Normal thyroid.      If you have any questions please feel free to contact [phone] or myself via [employer].    Sincerely,  Reina Briones, CNP

## 2021-11-04 NOTE — RESULT ENCOUNTER NOTE
Dear Lia,    Your recent test results are attached.       No excess protein in the urine.  No HIV.  No Hepatitis C.    If you have any questions please feel free to contact [phone] or myself via [employer].    Sincerely,  Reina Briones, CNP

## 2021-12-15 ENCOUNTER — TELEPHONE (OUTPATIENT)
Dept: FAMILY MEDICINE | Facility: CLINIC | Age: 61
End: 2021-12-15
Payer: COMMERCIAL

## 2021-12-15 NOTE — TELEPHONE ENCOUNTER
Prior Authorization Retail Medication Request    Medication/Dose:   SUMAtriptan (IMITREX) 100 MG tablet

## 2021-12-16 NOTE — TELEPHONE ENCOUNTER
Prior Authorization Not Needed per Insurance    Medication: SUMAtriptan (IMITREX) 100 MG tablet  Insurance Company: [facility] - Phone [phone] Fax [phone]  Expected CoPay:      Pharmacy Filling the Rx: [facility] [address] [facility] [address] [facility]  Pharmacy Notified: Yes  Patient Notified: Yes    Pharmacy received paid claim, no PA needed.

## 2022-01-09 ENCOUNTER — HEALTH MAINTENANCE LETTER (OUTPATIENT)
Age: 62
End: 2022-01-09

## 2022-01-27 ENCOUNTER — LAB (OUTPATIENT)
Dept: URGENT CARE | Facility: URGENT CARE | Age: 62
End: 2022-01-27
Attending: FAMILY MEDICINE
Payer: COMMERCIAL

## 2022-01-27 DIAGNOSIS — Z20.822 SUSPECTED 2019 NOVEL CORONAVIRUS INFECTION: ICD-10-CM

## 2022-01-27 PROCEDURE — U0005 INFEC AGEN DETEC AMPLI PROBE: HCPCS

## 2022-01-27 PROCEDURE — U0003 INFECTIOUS AGENT DETECTION BY NUCLEIC ACID (DNA OR RNA); SEVERE ACUTE RESPIRATORY SYNDROME CORONAVIRUS 2 (SARS-COV-2) (CORONAVIRUS DISEASE [COVID-19]), AMPLIFIED PROBE TECHNIQUE, MAKING USE OF HIGH THROUGHPUT TECHNOLOGIES AS DESCRIBED BY CMS-2020-01-R: HCPCS

## 2022-01-28 LAB — SARS-COV-2 RNA RESP QL NAA+PROBE: NEGATIVE

## 2022-03-06 ENCOUNTER — HEALTH MAINTENANCE LETTER (OUTPATIENT)
Age: 62
End: 2022-03-06

## 2022-03-08 ENCOUNTER — OFFICE VISIT (OUTPATIENT)
Dept: FAMILY MEDICINE | Facility: CLINIC | Age: 62
End: 2022-03-08
Payer: COMMERCIAL

## 2022-03-08 VITALS
HEART RATE: 88 BPM | HEIGHT: 66 IN | SYSTOLIC BLOOD PRESSURE: 110 MMHG | OXYGEN SATURATION: 100 % | WEIGHT: 243 LBS | TEMPERATURE: 98.1 F | BODY MASS INDEX: 39.05 KG/M2 | DIASTOLIC BLOOD PRESSURE: 66 MMHG

## 2022-03-08 DIAGNOSIS — M77.11 LATERAL EPICONDYLITIS OF RIGHT ELBOW: ICD-10-CM

## 2022-03-08 DIAGNOSIS — E11.65 TYPE 2 DIABETES MELLITUS WITH HYPERGLYCEMIA, WITHOUT LONG-TERM CURRENT USE OF INSULIN (H): Primary | ICD-10-CM

## 2022-03-08 DIAGNOSIS — E66.01 MORBID OBESITY (H): ICD-10-CM

## 2022-03-08 LAB — HBA1C MFR BLD: 6.5 % (ref 0–5.6)

## 2022-03-08 PROCEDURE — 83036 HEMOGLOBIN GLYCOSYLATED A1C: CPT | Performed by: NURSE PRACTITIONER

## 2022-03-08 PROCEDURE — 36415 COLL VENOUS BLD VENIPUNCTURE: CPT | Performed by: NURSE PRACTITIONER

## 2022-03-08 PROCEDURE — 99214 OFFICE O/P EST MOD 30 MIN: CPT | Performed by: NURSE PRACTITIONER

## 2022-03-08 RX ORDER — LISINOPRIL 5 MG/1
5 TABLET ORAL DAILY
Qty: 90 TABLET | Refills: 1 | Status: SHIPPED | OUTPATIENT
Start: 2022-03-08 | End: 2022-09-16

## 2022-03-08 RX ORDER — PEN NEEDLE, DIABETIC 32GX 5/32"
NEEDLE, DISPOSABLE MISCELLANEOUS
Qty: 100 EACH | Refills: 3 | Status: SHIPPED | OUTPATIENT
Start: 2022-03-08

## 2022-03-08 RX ORDER — LIRAGLUTIDE 6 MG/ML
1.8 INJECTION SUBCUTANEOUS DAILY
Qty: 9 ML | Refills: 5 | Status: SHIPPED | OUTPATIENT
Start: 2022-03-08 | End: 2022-10-14

## 2022-03-08 ASSESSMENT — PATIENT HEALTH QUESTIONNAIRE - PHQ9: SUM OF ALL RESPONSES TO PHQ QUESTIONS 1-9: 7

## 2022-03-08 ASSESSMENT — PAIN SCALES - GENERAL: PAINLEVEL: MILD PAIN (3)

## 2022-03-08 NOTE — PROGRESS NOTES
"  Assessment & Plan     Type 2 diabetes mellitus with hyperglycemia, without long-term current use of insulin (H)  Patient to stop glipizide and increase Victoza to 1.8 mg daily to help with weight loss and minimize medications.  - HEMOGLOBIN A1C; Future  - liraglutide (VICTOZA PEN) 18 MG/3ML solution; Inject 1.8 mg Subcutaneous daily  - insulin pen needle (ULTICARE MICRO) 32G X 4 MM miscellaneous; Use 1 pen needles daily or as directed.  - lisinopril (ZESTRIL) 5 MG tablet; Take 1 tablet (5 mg) by mouth daily  - metFORMIN (GLUCOPHAGE) 1000 MG tablet; Take 1 tablet (1,000 mg) by mouth 2 times daily (with meals)  - Hemoglobin A1c; Future  - HEMOGLOBIN A1C    Morbid obesity (H)  As above.     Lateral epicondylitis of right elbow  Patient to wear over the counter counterforce brace.  If not improving, consider physical therapy.      Ordering of each unique test  Prescription drug management         BMI:   Estimated body mass index is 39.29 kg/m  as calculated from the following:    Height as of this encounter: 1.675 m (5' 5.95\").    Weight as of this encounter: 110.2 kg (243 lb).           Return in about 6 months (around 9/8/2022) for Diabetic Check.    MANUELA Shepherd CNP  [facility] YUMIKO Rebolledo is a 61 year old who presents for the following health issues     HPI     Diabetes Follow-up    How often are you checking your blood sugar? One time daily  What time of day are you checking your blood sugars (select all that apply)?  Before meals  Have you had any blood sugars above 200?  No  Have you had any blood sugars below 70?  No    What symptoms do you notice when your blood sugar is low?  Not applicable    What concerns do you have today about your diabetes? None     Do you have any of these symptoms? (Select all that apply)  No numbness or tingling in feet.  No redness, sores or blisters on feet.  No complaints of excessive thirst.  No reports of blurry vision.  No significant " "changes to weight.    Have you had a diabetic eye exam in the last 12 months? No        BP Readings from Last 2 Encounters:   03/08/22 110/66   11/02/21 126/74     Hemoglobin A1C POCT (%)   Date Value   04/23/2021 9.3 (H)   01/06/2021 10.3 (H)     Hemoglobin A1C (%)   Date Value   03/08/2022 6.5 (H)   08/30/2021 6.0 (H)     LDL Cholesterol Calculated (mg/dL)   Date Value   04/23/2021 53   05/04/2020 44                 How many servings of fruits and vegetables do you eat daily?  4 or more    On average, how many sweetened beverages do you drink each day (Examples: soda, juice, sweet tea, etc.  Do NOT count diet or artificially sweetened beverages)?   1    How many days per week do you exercise enough to make your heart beat faster? 3- 4    How many minutes a day do you exercise enough to make your heart beat faster? 10 - 19    How many days per week do you miss taking your medication? 0    Patient notes that she no longer has hypoglycemia with the decrease in glipizide.      Patient notes pain to right elbow, radiating to forearm, and upper arm.  She has chronic neck pain.  She denies injury.  She will note pain at times with her shoulder, but it seems to mostly be coming from her elbow.  Tennis elbow brace has helped.    Review of Systems   Constitutional, HEENT, cardiovascular, pulmonary, gi and gu systems are negative, except as otherwise noted.      Objective    /66   Pulse 88   Temp 98.1  F (36.7  C) (Oral)   Ht 1.675 m (5' 5.95\")   Wt 110.2 kg (243 lb)   SpO2 100%   BMI 39.29 kg/m    Body mass index is 39.29 kg/m .  Physical Exam   GENERAL: healthy, alert and no distress  RESP: lungs clear to auscultation - no rales, rhonchi or wheezes  CV: regular rate and rhythm, normal S1 S2, no S3 or S4, no murmur, click or rub, no peripheral edema and peripheral pulses strong  MS: Tenderness to palpation of lateral epicondyle; full range of motion of elbow, shoulder, neck.  Negative Neer's, Hawkin's, Speed's, " Spurling's.

## 2022-03-09 NOTE — RESULT ENCOUNTER NOTE
Dear Lia,    Your recent test results are attached.      Okay 3 month blood sugar average.    If you have any questions please feel free to contact [phone] or myself via [employer].    Sincerely,  Reina Briones, CNP

## 2022-06-26 ENCOUNTER — HEALTH MAINTENANCE LETTER (OUTPATIENT)
Age: 62
End: 2022-06-26

## 2022-07-01 ENCOUNTER — TRANSFERRED RECORDS (OUTPATIENT)
Dept: MULTI SPECIALTY CLINIC | Facility: CLINIC | Age: 62
End: 2022-07-01

## 2022-07-01 LAB — RETINOPATHY: NORMAL

## 2022-07-03 DIAGNOSIS — F41.1 ANXIETY STATE: ICD-10-CM

## 2022-07-05 NOTE — TELEPHONE ENCOUNTER
"Pending Prescriptions:                       Disp   Refills    sertraline (ZOLOFT) 100 MG tablet [Pharmac*45 tab*0        Sig: TAKE 1 & 1/2 (ONE & ONE-HALF) TABLETS BY MOUTH ONCE           DAILY    Routing refill request to provider for review/approval because:  A break in medication    Requested Prescriptions   Pending Prescriptions Disp Refills    sertraline (ZOLOFT) 100 MG tablet [Pharmacy Med Name: Sertraline HCl 100 MG Oral Tablet] 45 tablet 0     Sig: TAKE 1 & 1/2 (ONE & ONE-HALF) TABLETS BY MOUTH ONCE DAILY        SSRIs Protocol Passed - 7/3/2022  2:40 PM        Passed - Recent (12 mo) or future (30 days) visit within the authorizing provider's specialty     Patient has had an office visit with the authorizing provider or a provider within the authorizing providers department within the previous 12 mos or has a future within next 30 days. See \"Patient Info\" tab in inbasket, or \"Choose Columns\" in Meds & Orders section of the refill encounter.              Passed - Medication is active on med list        Passed - Patient is age 18 or older        Passed - No active pregnancy on record        Passed - No positive pregnancy test in last 12 months              "

## 2022-07-12 RX ORDER — SERTRALINE HYDROCHLORIDE 100 MG/1
TABLET, FILM COATED ORAL
Qty: 45 TABLET | Refills: 0 | Status: SHIPPED | OUTPATIENT
Start: 2022-07-12 | End: 2022-08-22

## 2022-07-12 NOTE — TELEPHONE ENCOUNTER
"Pending Prescriptions:                       Disp   Refills    sertraline (ZOLOFT) 100 MG tablet [Pharmac*45 tab*0        Sig: TAKE 1 & 1/2 (ONE & ONE-HALF) TABLETS BY MOUTH ONCE           DAILY    Routing refill request to provider for review/approval because:  A break in medication    Next 5 appointments (look out 90 days)      Sep 06, 2022  8:00 AM  (Arrive by 7:40 AM)  Provider Visit with Ly Case PA-C  [facility] ([address] [address] [phone]              Requested Prescriptions   Pending Prescriptions Disp Refills    sertraline (ZOLOFT) 100 MG tablet [Pharmacy Med Name: Sertraline HCl 100 MG Oral Tablet] 45 tablet 0     Sig: TAKE 1 & 1/2 (ONE & ONE-HALF) TABLETS BY MOUTH ONCE DAILY        SSRIs Protocol Passed - 7/11/2022  4:12 PM        Passed - Recent (12 mo) or future (30 days) visit within the authorizing provider's specialty     Patient has had an office visit with the authorizing provider or a provider within the authorizing providers department within the previous 12 mos or has a future within next 30 days. See \"Patient Info\" tab in inbasket, or \"Choose Columns\" in Meds & Orders section of the refill encounter.              Passed - Medication is active on med list        Passed - Patient is age 18 or older        Passed - No active pregnancy on record        Passed - No positive pregnancy test in last 12 months                    "

## 2022-07-14 ENCOUNTER — TELEPHONE (OUTPATIENT)
Dept: FAMILY MEDICINE | Facility: CLINIC | Age: 62
End: 2022-07-14

## 2022-07-14 NOTE — TELEPHONE ENCOUNTER
Unable to reach patient.  Contacted pharmacy and they will consult with patient when she comes in for the medication to see if she has been on this medication/dose with the other medications over the last year.

## 2022-07-14 NOTE — TELEPHONE ENCOUNTER
Please call patient. I haven't seen this patient (former Reina Briones, NP patient).  Medication refilled via rx request.    Chart review reveals 7/1/2021 visit - she was on both sertraline and amitriptyline, doing well.     Has she continued the sertraline over the last year? Is she taking both without issues?   If so, ok to continue (please call pharmacy).     Ly Case PA-C

## 2022-07-14 NOTE — TELEPHONE ENCOUNTER
S: medication clarification  B: Pt given new medication by Ly Case  A: Amado from [facility]- [place] calling today wanting confirmation on new medication that has been prescribed.  Pharmacy got a prescription for Certraline 150mg which is a new prescription for the patient.  The patient is also taking amitriptyline and Imitrex.  Pharmacist wanted to be sure the provider knew that the patient was on these other medications and that the provider was aware of the potential for serotonin syndrome with the mix of those medications.   R: please call pharmacy and confirm that this prescription is okay to fill  Amado at [facility] [phone]

## 2022-08-22 DIAGNOSIS — F41.1 ANXIETY STATE: ICD-10-CM

## 2022-08-22 RX ORDER — SERTRALINE HYDROCHLORIDE 100 MG/1
TABLET, FILM COATED ORAL
Qty: 45 TABLET | Refills: 2 | Status: SHIPPED | OUTPATIENT
Start: 2022-08-22 | End: 2022-09-16

## 2022-08-22 NOTE — TELEPHONE ENCOUNTER
Routing refill request to provider for review/approval because:  Drug interaction warning    Marcia Bentley RN BSN  [facility]

## 2022-09-12 DIAGNOSIS — E11.65 TYPE 2 DIABETES MELLITUS WITH HYPERGLYCEMIA, WITHOUT LONG-TERM CURRENT USE OF INSULIN (H): ICD-10-CM

## 2022-09-12 DIAGNOSIS — G43.009 MIGRAINE WITHOUT AURA AND WITHOUT STATUS MIGRAINOSUS, NOT INTRACTABLE: ICD-10-CM

## 2022-09-12 RX ORDER — SUMATRIPTAN 100 MG/1
100 TABLET, FILM COATED ORAL
Qty: 12 TABLET | Refills: 0 | Status: SHIPPED | OUTPATIENT
Start: 2022-09-12 | End: 2023-02-21

## 2022-09-14 NOTE — TELEPHONE ENCOUNTER
"Pending Prescriptions:                       Disp   Refills    lisinopril (ZESTRIL) 5 MG tablet [Pharmacy*30 tab*0        Sig: TAKE 1 TABLET (5MG) BY MOUTH ONCE DAILY        Routing refill request to provider for review/approval because:    ACE Inhibitors (Including Combos) Protocol Failed    Rerun Protocol (9/12/2022 6:54 AM)      Normal serum creatinine on file in past 12 months        Recent Labs   Lab Test 04/23/21  0909   CR 0.85      Ok to refill medication if creatinine is low      Normal serum potassium on file in past 12 months        Recent Labs   Lab Test 04/23/21  0909   POTASSIUM 4.0         Blood pressure under 140/90 in past 12 months        BP Readings from Last 3 Encounters:   03/08/22 110/66   11/02/21 126/74   01/06/21 132/80             Recent (12 mo) or future (30 days) visit within the authorizing provider's specialty    Patient has had an office visit with the authorizing provider or a provider within the authorizing providers department within the previous 12 mos or has a future within next 30 days. See \"Patient Info\" tab in inbasket, or \"Choose Columns\" in Meds & Orders section of the refill encounter.           Medication is active on med list          Patient is age 18 or older          No active pregnancy on record          No positive pregnancy test within past 12 months          "

## 2022-09-15 NOTE — TELEPHONE ENCOUNTER
Pt has seen Reina Briones NP [facility] for care past few visits. Routing to her.   Alicia Anaya PA-C

## 2022-09-16 RX ORDER — LISINOPRIL 5 MG/1
TABLET ORAL
Qty: 30 TABLET | Refills: 0 | Status: SHIPPED | OUTPATIENT
Start: 2022-09-16 | End: 2022-09-16

## 2022-10-13 DIAGNOSIS — E11.65 TYPE 2 DIABETES MELLITUS WITH HYPERGLYCEMIA, WITHOUT LONG-TERM CURRENT USE OF INSULIN (H): ICD-10-CM

## 2022-10-14 RX ORDER — LIRAGLUTIDE 6 MG/ML
INJECTION SUBCUTANEOUS
Qty: 9 ML | Refills: 0 | Status: SHIPPED | OUTPATIENT
Start: 2022-10-14 | End: 2022-11-17

## 2022-11-16 DIAGNOSIS — E11.65 TYPE 2 DIABETES MELLITUS WITH HYPERGLYCEMIA, WITHOUT LONG-TERM CURRENT USE OF INSULIN (H): ICD-10-CM

## 2022-11-16 DIAGNOSIS — E78.5 HYPERLIPIDEMIA LDL GOAL <100: ICD-10-CM

## 2022-11-16 DIAGNOSIS — E03.4 HYPOTHYROIDISM DUE TO ACQUIRED ATROPHY OF THYROID: ICD-10-CM

## 2022-11-16 DIAGNOSIS — F41.1 ANXIETY STATE: ICD-10-CM

## 2022-11-17 RX ORDER — AMITRIPTYLINE HYDROCHLORIDE 100 MG/1
TABLET ORAL
Qty: 30 TABLET | Refills: 0 | OUTPATIENT
Start: 2022-11-17

## 2022-11-17 RX ORDER — LIRAGLUTIDE 6 MG/ML
INJECTION SUBCUTANEOUS
Qty: 9 ML | Refills: 0 | Status: SHIPPED | OUTPATIENT
Start: 2022-11-17 | End: 2022-12-05

## 2022-11-17 RX ORDER — LEVOTHYROXINE SODIUM 50 UG/1
TABLET ORAL
Qty: 30 TABLET | Refills: 0 | OUTPATIENT
Start: 2022-11-17

## 2022-11-17 RX ORDER — ROSUVASTATIN CALCIUM 20 MG/1
TABLET, COATED ORAL
Qty: 30 TABLET | Refills: 0 | OUTPATIENT
Start: 2022-11-17

## 2022-11-17 NOTE — TELEPHONE ENCOUNTER
Refills remain on file. Refused.     Felisa Zendejas, RN, BSN, PHN  [facility] [place]

## 2022-11-21 ENCOUNTER — HEALTH MAINTENANCE LETTER (OUTPATIENT)
Age: 62
End: 2022-11-21

## 2022-12-03 DIAGNOSIS — E11.65 TYPE 2 DIABETES MELLITUS WITH HYPERGLYCEMIA, WITHOUT LONG-TERM CURRENT USE OF INSULIN (H): Chronic | ICD-10-CM

## 2022-12-03 DIAGNOSIS — E11.65 TYPE 2 DIABETES MELLITUS WITH HYPERGLYCEMIA, WITHOUT LONG-TERM CURRENT USE OF INSULIN (H): ICD-10-CM

## 2022-12-05 RX ORDER — LIRAGLUTIDE 6 MG/ML
INJECTION SUBCUTANEOUS
Qty: 9 ML | Refills: 0 | Status: SHIPPED | OUTPATIENT
Start: 2022-12-05 | End: 2023-01-30

## 2023-01-30 ENCOUNTER — E-VISIT (OUTPATIENT)
Dept: URGENT CARE | Facility: CLINIC | Age: 63
End: 2023-01-30
Payer: COMMERCIAL

## 2023-01-30 DIAGNOSIS — J01.90 ACUTE BACTERIAL SINUSITIS: Primary | ICD-10-CM

## 2023-01-30 DIAGNOSIS — E11.65 TYPE 2 DIABETES MELLITUS WITH HYPERGLYCEMIA, WITHOUT LONG-TERM CURRENT USE OF INSULIN (H): ICD-10-CM

## 2023-01-30 DIAGNOSIS — B96.89 ACUTE BACTERIAL SINUSITIS: Primary | ICD-10-CM

## 2023-01-30 PROCEDURE — 99421 OL DIG E/M SVC 5-10 MIN: CPT | Performed by: FAMILY MEDICINE

## 2023-01-30 RX ORDER — LIRAGLUTIDE 6 MG/ML
INJECTION SUBCUTANEOUS
Qty: 9 ML | Refills: 0 | Status: SHIPPED | OUTPATIENT
Start: 2023-01-30 | End: 2023-02-21

## 2023-01-30 NOTE — PATIENT INSTRUCTIONS
Dear Lia Lewis    After reviewing your responses, I've been able to diagnose you with Acute bacterial sinusitis.      Based on your responses and diagnosis, I have prescribed   Orders Placed This Encounter     amoxicillin-clavulanate (AUGMENTIN) 875-125 MG tablet    to treat your symptoms. I have sent this to your pharmacy.?     It is also important to stay well hydrated, get lots of rest and take over-the-counter decongestants,?tylenol?or ibuprofen if you?are able to?take those medications per your primary care provider to help relieve discomfort.?     It is important that you take?all of?your prescribed medication even if your symptoms are improving after a few doses.? Taking?all of?your medicine helps prevent the symptoms from returning.?     If your symptoms worsen, you develop severe headache, vomiting, high fever (>102), or are not improving in 7 days, please contact your primary care provider for an appointment or visit any of our convenient Walk-in Care or Urgent Care Centers to be seen which can be found on our website?here.?     Thanks again for choosing?us?as your health care partner,?   ?  Lisa Tristan MD?

## 2023-02-21 ENCOUNTER — OFFICE VISIT (OUTPATIENT)
Dept: FAMILY MEDICINE | Facility: CLINIC | Age: 63
End: 2023-02-21
Payer: COMMERCIAL

## 2023-02-21 VITALS
BODY MASS INDEX: 36.16 KG/M2 | WEIGHT: 225 LBS | TEMPERATURE: 98.7 F | DIASTOLIC BLOOD PRESSURE: 80 MMHG | OXYGEN SATURATION: 97 % | SYSTOLIC BLOOD PRESSURE: 131 MMHG | HEART RATE: 94 BPM | HEIGHT: 66 IN | RESPIRATION RATE: 14 BRPM

## 2023-02-21 DIAGNOSIS — G43.009 MIGRAINE WITHOUT AURA AND WITHOUT STATUS MIGRAINOSUS, NOT INTRACTABLE: ICD-10-CM

## 2023-02-21 DIAGNOSIS — Z12.31 VISIT FOR SCREENING MAMMOGRAM: ICD-10-CM

## 2023-02-21 DIAGNOSIS — E03.4 HYPOTHYROIDISM DUE TO ACQUIRED ATROPHY OF THYROID: ICD-10-CM

## 2023-02-21 DIAGNOSIS — R49.0 HOARSENESS: ICD-10-CM

## 2023-02-21 DIAGNOSIS — E11.65 TYPE 2 DIABETES MELLITUS WITH HYPERGLYCEMIA, WITHOUT LONG-TERM CURRENT USE OF INSULIN (H): Primary | Chronic | ICD-10-CM

## 2023-02-21 DIAGNOSIS — E66.01 MORBID OBESITY (H): ICD-10-CM

## 2023-02-21 DIAGNOSIS — Z12.31 ENCOUNTER FOR SCREENING MAMMOGRAM FOR BREAST CANCER: ICD-10-CM

## 2023-02-21 DIAGNOSIS — E78.5 HYPERLIPIDEMIA LDL GOAL <100: ICD-10-CM

## 2023-02-21 LAB — HBA1C MFR BLD: 11.7 % (ref 0–5.6)

## 2023-02-21 PROCEDURE — 84443 ASSAY THYROID STIM HORMONE: CPT | Performed by: PHYSICIAN ASSISTANT

## 2023-02-21 PROCEDURE — 80048 BASIC METABOLIC PNL TOTAL CA: CPT | Performed by: PHYSICIAN ASSISTANT

## 2023-02-21 PROCEDURE — 82570 ASSAY OF URINE CREATININE: CPT | Performed by: PHYSICIAN ASSISTANT

## 2023-02-21 PROCEDURE — 99214 OFFICE O/P EST MOD 30 MIN: CPT | Performed by: PHYSICIAN ASSISTANT

## 2023-02-21 PROCEDURE — 36415 COLL VENOUS BLD VENIPUNCTURE: CPT | Performed by: PHYSICIAN ASSISTANT

## 2023-02-21 PROCEDURE — 80061 LIPID PANEL: CPT | Performed by: PHYSICIAN ASSISTANT

## 2023-02-21 PROCEDURE — 83036 HEMOGLOBIN GLYCOSYLATED A1C: CPT | Performed by: PHYSICIAN ASSISTANT

## 2023-02-21 PROCEDURE — 82043 UR ALBUMIN QUANTITATIVE: CPT | Performed by: PHYSICIAN ASSISTANT

## 2023-02-21 RX ORDER — SUMATRIPTAN 100 MG/1
100 TABLET, FILM COATED ORAL
Qty: 12 TABLET | Refills: 11 | Status: SHIPPED | OUTPATIENT
Start: 2023-02-21

## 2023-02-21 RX ORDER — LIRAGLUTIDE 6 MG/ML
INJECTION SUBCUTANEOUS
Qty: 9 ML | Refills: 3 | Status: SHIPPED | OUTPATIENT
Start: 2023-02-21 | End: 2023-04-04

## 2023-02-21 ASSESSMENT — ANXIETY QUESTIONNAIRES
5. BEING SO RESTLESS THAT IT IS HARD TO SIT STILL: NOT AT ALL
2. NOT BEING ABLE TO STOP OR CONTROL WORRYING: SEVERAL DAYS
IF YOU CHECKED OFF ANY PROBLEMS ON THIS QUESTIONNAIRE, HOW DIFFICULT HAVE THESE PROBLEMS MADE IT FOR YOU TO DO YOUR WORK, TAKE CARE OF THINGS AT HOME, OR GET ALONG WITH OTHER PEOPLE: SOMEWHAT DIFFICULT
4. TROUBLE RELAXING: MORE THAN HALF THE DAYS
GAD7 TOTAL SCORE: 8
7. FEELING AFRAID AS IF SOMETHING AWFUL MIGHT HAPPEN: SEVERAL DAYS
3. WORRYING TOO MUCH ABOUT DIFFERENT THINGS: MORE THAN HALF THE DAYS
7. FEELING AFRAID AS IF SOMETHING AWFUL MIGHT HAPPEN: SEVERAL DAYS
1. FEELING NERVOUS, ANXIOUS, OR ON EDGE: MORE THAN HALF THE DAYS
GAD7 TOTAL SCORE: 8
GAD7 TOTAL SCORE: 8
6. BECOMING EASILY ANNOYED OR IRRITABLE: NOT AT ALL
8. IF YOU CHECKED OFF ANY PROBLEMS, HOW DIFFICULT HAVE THESE MADE IT FOR YOU TO DO YOUR WORK, TAKE CARE OF THINGS AT HOME, OR GET ALONG WITH OTHER PEOPLE?: SOMEWHAT DIFFICULT

## 2023-02-21 ASSESSMENT — PATIENT HEALTH QUESTIONNAIRE - PHQ9
10. IF YOU CHECKED OFF ANY PROBLEMS, HOW DIFFICULT HAVE THESE PROBLEMS MADE IT FOR YOU TO DO YOUR WORK, TAKE CARE OF THINGS AT HOME, OR GET ALONG WITH OTHER PEOPLE: VERY DIFFICULT
SUM OF ALL RESPONSES TO PHQ QUESTIONS 1-9: 13
SUM OF ALL RESPONSES TO PHQ QUESTIONS 1-9: 13

## 2023-02-21 ASSESSMENT — PAIN SCALES - GENERAL: PAINLEVEL: NO PAIN (0)

## 2023-02-21 NOTE — PROGRESS NOTES
Assessment & Plan     Type 2 diabetes mellitus with hyperglycemia, without long-term current use of insulin (H)  Needs labs  F/u 3 months  - Basic metabolic panel  (Ca, Cl, CO2, Creat, Gluc, K, Na, BUN); Future  - Lipid panel reflex to direct LDL Fasting; Future  - Hemoglobin A1c; Future  - TSH with free T4 reflex; Future  - Albumin Random Urine Quantitative with Creat Ratio; Future  - metFORMIN (GLUCOPHAGE) 1000 MG tablet; Take 1 tablet (1,000 mg) by mouth 2 times daily (with meals)  - liraglutide (VICTOZA PEN) 18 MG/3ML solution; INJECT 1.8 MG SUBCUTANEOUSLY ONCE DAILY  - Basic metabolic panel  (Ca, Cl, CO2, Creat, Gluc, K, Na, BUN)  - Lipid panel reflex to direct LDL Fasting  - Hemoglobin A1c  - TSH with free T4 reflex  - Albumin Random Urine Quantitative with Creat Ratio    Hyperlipidemia LDL goal <100      Morbid obesity (H)      Hypothyroidism due to acquired atrophy of thyroid      Visit for screening mammogram    - MA SCREENING DIGITAL BILAT - Future  (s+30); Future    Encounter for screening mammogram for breast cancer    - MA SCREENING DIGITAL BILAT - Future  (s+30); Future    Hoarseness  Needs ent due to duration  - Adult ENT  Referral; Future    Migraine without aura and without status migrainosus, not intractable  stable  - SUMAtriptan (IMITREX) 100 MG tablet; Take 1 tablet (100 mg) by mouth at onset of headache for migraine MAY REPEAT ONCE AFTER 2 HOURS. DO NOT EXCEED 2 TABLETS IN 24 HOURS    Billin min spent on patient today including chart review, history, exam, and explaining treatment plan and follow-up.       Return in about 1 week (around 2023) for Physical Exam, pap.    CARRIE Juares [facility] CALEB Rebolledo is a 62 year old accompanied by her Self, presenting for the following health issues:  Diabetes and Throat Problem (/)    Colon cancer screening is due per pt.    PAP is due per pt.    MAMMO is due per pt will call.    Had  sore throat, then lost voice. Has not got it back for 1.5 months now so very concerning to her. Has not seen ENT. Tested negative for flu and strep in the beginning. No tobacco use.         History of Present Illness       Mental Health Follow-up:  Patient presents to follow-up on Depression & Anxiety.Patient's depression since last visit has been:  Medium  The patient is having other symptoms associated with depression.  Patient's anxiety since last visit has been:  Medium  The patient is having other symptoms associated with anxiety.  Any significant life events: job concerns, financial concerns and health concerns  Patient is feeling anxious or having panic attacks.  Patient has no concerns about alcohol or drug use.    Diabetes:   She presents for follow up of diabetes.  She is checking home blood glucose one time daily. She checks blood glucose before meals.  Blood glucose is sometimes over 200 and never under 70. She is aware of hypoglycemia symptoms including shakiness and dizziness. She is concerned about blood sugar frequently over 200. She is having excessive thirst.         Reason for visit:  Med check and laryngitis    She eats 2-3 servings of fruits and vegetables daily.She consumes 1 sweetened beverage(s) daily.She exercises with enough effort to increase her heart rate 10 to 19 minutes per day.  She exercises with enough effort to increase her heart rate 4 days per week. She is missing 1 dose(s) of medications per week.  She is not taking prescribed medications regularly due to other.    Today's PHQ-9         PHQ-9 Total Score: 13    PHQ-9 Q9 Thoughts of better off dead/self-harm past 2 weeks :   Not at all    How difficult have these problems made it for you to do your work, take care of things at home, or get along with other people: Very difficult  Today's KATHLEEN-7 Score: 8       Ran out of victoza so thinks a1c will be high.   Sugars at home have been 300. Before running out she was in the 100s.     "  htn-No chest pain, shortness of breath, edema, PND, or orthopnea. No dizziness or vision changes. No side effects from medications. Blood pressure has been stable on medication.      Not fasting. Will get other labs.     Nonsmoker. No paresthesias.     Review of Systems   Constitutional, HEENT, cardiovascular, pulmonary, GI, , musculoskeletal, neuro, skin, endocrine and psych systems are negative, except as otherwise noted.      Objective    /80   Pulse 94   Temp 98.7  F (37.1  C) (Tympanic)   Resp 14   Ht 1.676 m (5' 6\")   Wt 102.1 kg (225 lb)   LMP  (LMP Unknown)   SpO2 97%   Breastfeeding No   BMI 36.32 kg/m    Body mass index is 36.32 kg/m .  Physical Exam   GENERAL: obese,  alert and no distress. Hoarse voice.  NECK: no adenopathy, no asymmetry, masses, or scars and thyroid normal to palpation  RESP: lungs clear to auscultation - no rales, rhonchi or wheezes  CV: regular rate and rhythm, normal S1 S2, no S3 or S4, no murmur, click or rub, no peripheral edema and peripheral pulses strong  MS: no gross musculoskeletal defects noted, no edema  NEURO: Normal strength and tone, mentation intact and speech normal  PSYCH: mentation appears normal, affect normal/bright              "

## 2023-02-22 LAB
ANION GAP SERPL CALCULATED.3IONS-SCNC: 5 MMOL/L (ref 3–14)
BUN SERPL-MCNC: 15 MG/DL (ref 7–30)
CALCIUM SERPL-MCNC: 9.2 MG/DL (ref 8.5–10.1)
CHLORIDE BLD-SCNC: 106 MMOL/L (ref 94–109)
CHOLEST SERPL-MCNC: 120 MG/DL
CO2 SERPL-SCNC: 30 MMOL/L (ref 20–32)
CREAT SERPL-MCNC: 0.81 MG/DL (ref 0.52–1.04)
CREAT UR-MCNC: 113 MG/DL
FASTING STATUS PATIENT QL REPORTED: NO
GFR SERPL CREATININE-BSD FRML MDRD: 82 ML/MIN/1.73M2
GLUCOSE BLD-MCNC: 347 MG/DL (ref 70–99)
HDLC SERPL-MCNC: 61 MG/DL
LDLC SERPL CALC-MCNC: 32 MG/DL
MICROALBUMIN UR-MCNC: 26 MG/L
MICROALBUMIN/CREAT UR: 23.01 MG/G CR (ref 0–25)
NONHDLC SERPL-MCNC: 59 MG/DL
POTASSIUM BLD-SCNC: 4.6 MMOL/L (ref 3.4–5.3)
SODIUM SERPL-SCNC: 141 MMOL/L (ref 133–144)
TRIGL SERPL-MCNC: 133 MG/DL
TSH SERPL DL<=0.005 MIU/L-ACNC: 3.93 MU/L (ref 0.4–4)

## 2023-02-22 NOTE — RESULT ENCOUNTER NOTE
Enriqueta Fitzgerald,       Your recent test results are attached, if you have any questions or concerns please feel free to contact me via e-mail or call [phone].  A1c is very poor at over 11.please continue on victoza and recheck in 3 months with labs.   Thyroid to goal. Cholesterol to goal.      It was a pleasure to see you at your recent office visit.      Sincerely,  Carla Fong PA-C

## 2023-02-24 DIAGNOSIS — B96.89 ACUTE BACTERIAL SINUSITIS: ICD-10-CM

## 2023-02-24 DIAGNOSIS — J01.90 ACUTE BACTERIAL SINUSITIS: ICD-10-CM

## 2023-03-31 ENCOUNTER — TELEPHONE (OUTPATIENT)
Dept: FAMILY MEDICINE | Facility: CLINIC | Age: 63
End: 2023-03-31
Payer: COMMERCIAL

## 2023-03-31 NOTE — LETTER
April 17, 2023    To  Lia Lewis  [address]  [place] [address]    Your team at [facility] cares about your health. We have reviewed your chart and based on our findings; we are making the following recommendations to better manage your health.     You are in particular need of attention regarding the following:     Schedule a DIABETIC FOLLOW UP appointment for Office Visit. Patients with diabetes should see their provider regularly.  Schedule Annual MAMMOGRAPHY. 1 in 8 women will develop invasive breast cancer during her lifetime and it is the most common non-skin cancer in [place] Women. EARLY detection, new treatments, and a better understanding of the disease have increased survival rates- the 5 year survival rate in the 1960's was 63% and today it is close to 90%.  If you are under/uninsured, we recommend you contact the [facility]. They offer mammograms at no charge or on a sliding fee charge. You can schedule with them at [phone]. Please have them send us the results.   Schedule a primary care office visit with your provider for a Pap Smear to screen for Cervical Cancer.  Colon cancer is now the second leading cause of cancer-related deaths in the [place] for both men and women and there are over 130,000 new cases and 50,000 deaths per year from colon cancer. Colonoscopies can prevent 90-95% of these deaths. Problem lesions can be removed before they ever become cancer. This test is not only looking for cancer, but also getting rid of precancerous lesions.   If you are under/uninsured, we recommend you contact the [employer] Program.[employer] is a free colorectal cancer screening program that provides colonoscopies for eligible under/uninsured [place] men and women. If you are interested in receiving a free colonoscopy, please call [employer] at t [phone] (mention code [employer]) to see if you're eligible. Please have them send us the results.    Schedule an office visit with your provider if you are interested in completing your colon cancer screening with a Cologuard test  PREVENTATIVE VISIT: Physical    If you have already completed these items, please contact the clinic via phone or   [employer] so your care team can review and update your records. Thank you for   choosing [facility] for your healthcare needs. For any questions,   concerns, or to schedule an appointment please contact our clinic.    Healthy Regards,      Your [facility] Care Team

## 2023-03-31 NOTE — TELEPHONE ENCOUNTER
Patient Quality Outreach    Patient is due for the following:   Diabetes -  Foot Exam  Colon Cancer Screening  Breast Cancer Screening - Mammogram  Cervical Cancer Screening - PAP Needed  Physical Preventive Adult Physical    Next Steps:   Schedule a Adult Preventative    Type of outreach:    Sent [employer] message.    Next Steps:  Reach out within 90 days via [employer].    Max number of attempts reached: No. Will try again in 90 days if patient still on fail list.    Questions for provider review:    None     Deondre Noble MA  Chart routed to Care Team.

## 2023-04-14 ENCOUNTER — MYC MEDICAL ADVICE (OUTPATIENT)
Dept: FAMILY MEDICINE | Facility: CLINIC | Age: 63
End: 2023-04-14
Payer: COMMERCIAL

## 2023-04-14 DIAGNOSIS — E11.65 TYPE 2 DIABETES MELLITUS WITH HYPERGLYCEMIA, WITHOUT LONG-TERM CURRENT USE OF INSULIN (H): Primary | ICD-10-CM

## 2023-04-16 ENCOUNTER — HEALTH MAINTENANCE LETTER (OUTPATIENT)
Age: 63
End: 2023-04-16

## 2023-04-17 NOTE — TELEPHONE ENCOUNTER
Patient Quality Outreach    Patient is due for the following:   Diabetes -  Foot Exam  Colon Cancer Screening  Breast Cancer Screening - Mammogram  Cervical Cancer Screening - PAP Needed  Physical Preventive Adult Physical    Next Steps:   Schedule a Adult Preventative    Type of outreach:    Sent letter. pts vm full unable to leave a message    Next Steps:  Reach out within 90 days via Phone.    Max number of attempts reached: Yes. Will try again in 90 days if patient still on fail list.    Questions for provider review:    None     Deondre Noble MA  Chart routed to Care Team.

## 2023-05-16 NOTE — PROGRESS NOTES
{PROVIDER CHARTING PREFERENCE:233344}    Subjective   Kesha is a 62 year old, presenting for the following health issues:  Covid Concern (Long-haul COVID. Fatigue, decreased appetite, Brain fog )         View : No data to display.              HPI     {SUPERLIST (Optional):616767}  {additonal problems for provider to add (Optional):332915}      Review of Systems   {ROS COMP (Optional):664981}      Objective    LMP  (LMP Unknown)   There is no height or weight on file to calculate BMI.  Physical Exam   {Exam List (Optional):838622}    {Diagnostic Test Results (Optional):072702}    {AMBULATORY ATTESTATION (Optional):191391}               Yes

## 2023-05-18 ENCOUNTER — OFFICE VISIT (OUTPATIENT)
Dept: FAMILY MEDICINE | Facility: CLINIC | Age: 63
End: 2023-05-18
Payer: COMMERCIAL

## 2023-05-18 ENCOUNTER — LAB (OUTPATIENT)
Dept: FAMILY MEDICINE | Facility: CLINIC | Age: 63
End: 2023-05-18

## 2023-05-18 VITALS
HEIGHT: 66 IN | BODY MASS INDEX: 34.72 KG/M2 | OXYGEN SATURATION: 98 % | TEMPERATURE: 97.9 F | RESPIRATION RATE: 21 BRPM | DIASTOLIC BLOOD PRESSURE: 71 MMHG | HEART RATE: 94 BPM | WEIGHT: 216 LBS | SYSTOLIC BLOOD PRESSURE: 113 MMHG

## 2023-05-18 DIAGNOSIS — U09.9 LONG COVID: ICD-10-CM

## 2023-05-18 DIAGNOSIS — Z12.11 SCREEN FOR COLON CANCER: ICD-10-CM

## 2023-05-18 DIAGNOSIS — E03.4 HYPOTHYROIDISM DUE TO ACQUIRED ATROPHY OF THYROID: ICD-10-CM

## 2023-05-18 DIAGNOSIS — M54.50 RIGHT-SIDED LOW BACK PAIN WITHOUT SCIATICA, UNSPECIFIED CHRONICITY: ICD-10-CM

## 2023-05-18 DIAGNOSIS — E11.65 TYPE 2 DIABETES MELLITUS WITH HYPERGLYCEMIA, WITHOUT LONG-TERM CURRENT USE OF INSULIN (H): Primary | ICD-10-CM

## 2023-05-18 LAB
BASOPHILS # BLD AUTO: 0.1 10E3/UL (ref 0–0.2)
BASOPHILS NFR BLD AUTO: 1 %
EOSINOPHIL # BLD AUTO: 0.3 10E3/UL (ref 0–0.7)
EOSINOPHIL NFR BLD AUTO: 7 %
ERYTHROCYTE [DISTWIDTH] IN BLOOD BY AUTOMATED COUNT: 12.7 % (ref 10–15)
HBA1C MFR BLD: 13.2 % (ref 0–5.6)
HCT VFR BLD AUTO: 40.3 % (ref 35–47)
HGB BLD-MCNC: 13.1 G/DL (ref 11.7–15.7)
LYMPHOCYTES # BLD AUTO: 1.6 10E3/UL (ref 0.8–5.3)
LYMPHOCYTES NFR BLD AUTO: 31 %
MCH RBC QN AUTO: 29.5 PG (ref 26.5–33)
MCHC RBC AUTO-ENTMCNC: 32.5 G/DL (ref 31.5–36.5)
MCV RBC AUTO: 91 FL (ref 78–100)
MONOCYTES # BLD AUTO: 0.5 10E3/UL (ref 0–1.3)
MONOCYTES NFR BLD AUTO: 10 %
NEUTROPHILS # BLD AUTO: 2.6 10E3/UL (ref 1.6–8.3)
NEUTROPHILS NFR BLD AUTO: 52 %
PLATELET # BLD AUTO: 180 10E3/UL (ref 150–450)
RBC # BLD AUTO: 4.44 10E6/UL (ref 3.8–5.2)
WBC # BLD AUTO: 5.1 10E3/UL (ref 4–11)

## 2023-05-18 PROCEDURE — 80050 GENERAL HEALTH PANEL: CPT | Performed by: PHYSICIAN ASSISTANT

## 2023-05-18 PROCEDURE — 99215 OFFICE O/P EST HI 40 MIN: CPT | Performed by: PHYSICIAN ASSISTANT

## 2023-05-18 PROCEDURE — 84439 ASSAY OF FREE THYROXINE: CPT | Performed by: PHYSICIAN ASSISTANT

## 2023-05-18 PROCEDURE — 83036 HEMOGLOBIN GLYCOSYLATED A1C: CPT | Performed by: PHYSICIAN ASSISTANT

## 2023-05-18 PROCEDURE — 99207 PR FOOT EXAM NO CHARGE: CPT | Mod: 25 | Performed by: PHYSICIAN ASSISTANT

## 2023-05-18 PROCEDURE — 36415 COLL VENOUS BLD VENIPUNCTURE: CPT | Performed by: PHYSICIAN ASSISTANT

## 2023-05-18 ASSESSMENT — ENCOUNTER SYMPTOMS: BACK PAIN: 1

## 2023-05-18 ASSESSMENT — PAIN SCALES - GENERAL: PAINLEVEL: EXTREME PAIN (8)

## 2023-05-18 NOTE — PROGRESS NOTES
"  Assessment & Plan     Type 2 diabetes mellitus with hyperglycemia, without long-term current use of insulin (H)  Not controlled  - HEMOGLOBIN A1C; Future  - empagliflozin (JARDIANCE) 10 MG TABS tablet; Take 1 tablet (10 mg) by mouth daily  - Comprehensive metabolic panel (BMP + Alb, Alk Phos, ALT, AST, Total. Bili, TP); Future  - CBC with platelets and differential; Future  - HEMOGLOBIN A1C  - Comprehensive metabolic panel (BMP + Alb, Alk Phos, ALT, AST, Total. Bili, TP)  - CBC with platelets and differential  Previously was controlled to some degree. Victoza not covered well under insurance. Trial jardiance and if not covered will consider DPP4 vs actos for additional control over added glipizide    Hypothyroidism due to acquired atrophy of thyroid  Stable  - TSH with free T4 reflex; Future  - TSH with free T4 reflex  Recheck labs today    Right-sided low back pain without sciatica, unspecified chronicity  Ongoing  Stretches are helpful, continue. If not improving can try muscle relaxant and PT which patient declined for now    Long COVID  Ongoing x few months  Slowly improving but after covid has felt more fatigued and with brain fog. Getting better with more activity and encouraged to move around and do physical activity daily    Screen for colon cancer  Re-ordered  - COLOGAMELIA([employer]); Future      I spent 42 minutes reviewing reviewing notes, charting, in exam room with patient.  LEONOR GENAO PA-C         BMI:   Estimated body mass index is 34.86 kg/m  as calculated from the following:    Height as of this encounter: 1.676 m (5' 6\").    Weight as of this encounter: 98 kg (216 lb).   Weight management plan: Discussed healthy diet and exercise guidelines    3 month follow up for diabetic recheck    LEONOR GENAO PA-C  [facility]      Kevin Rebolledo is a 62 year old, presenting for the following health issues:  Covid Concern (Long-haul COVID. Fatigue, decreased appetite, Brain " fog ) and Back Pain (Lower RT-sided )        5/18/2023     2:27 PM   Additional Questions   Roomed by Gael   Accompanied by Self         5/18/2023     2:27 PM   Patient Reported Additional Medications   Patient reports taking the following new medications None     Back Pain     History of Present Illness       Back Pain:  She presents for follow up of back pain. Patient's back pain is a new problem.    Original cause of back pain: turning/bending  First noticed back pain: 1-4 weeks ago  Patient feels back pain: dailyLocation of back pain:  Right lower back  Description of back pain: sharp  Back pain spreads: nowhere    Since patient first noticed back pain, pain is: gradually worsening  Does back pain interfere with her job:  Not applicable  On a scale of 1-10 (10 being the worst), patient describes pain as:  8  What makes back pain worse: bending, certain positions and twisting  Acupuncture: not tried  Acetaminophen: not helpful  Activity or exercise: helpful  Chiropractor:  Not tried  Cold: not tried  Heat: not tried  Massage: not tried  Muscle relaxants: not tried  NSAIDS: not tried  Opioids: not tried  Physical Therapy: not tried  Rest: helpful  Steroid Injection: not tried  Stretching: helpful  Surgery: not tried  TENS unit: not tried  Topical pain relievers: not helpful  Other healthcare providers patient is seeing for back pain: None    Diabetes:   She presents for follow up of diabetes.  She is checking home blood glucose one time daily. She checks blood glucose before meals.  Blood glucose is sometimes over 200 and never under 70. She is aware of hypoglycemia symptoms including shakiness. She is concerned about blood sugar frequently over 200. She is not experiencing numbness or burning in feet, excessive thirst, blurry vision, weight changes or redness, sores or blisters on feet.         She eats 2-3 servings of fruits and vegetables daily.She consumes 1 sweetened beverage(s) daily.She exercises with  "enough effort to increase her heart rate 30 to 60 minutes per day.  She exercises with enough effort to increase her heart rate 5 days per week. She is missing 1 dose(s) of medications per week.  She is not taking prescribed medications regularly due to remembering to take.               Review of Systems   Musculoskeletal: Positive for back pain.      Constitutional, HEENT, cardiovascular, pulmonary, gi and gu systems are negative, except as otherwise noted.      Objective    /71   Pulse 94   Temp 97.9  F (36.6  C) (Tympanic)   Resp 21   Ht 1.676 m (5' 6\")   Wt 98 kg (216 lb)   LMP  (LMP Unknown)   SpO2 98%   BMI 34.86 kg/m    Body mass index is 34.86 kg/m .  Physical Exam   GENERAL: healthy, alert and no distress  HENT: ear canals and TM's normal, nose and mouth without ulcers or lesions  NECK: no adenopathy, no asymmetry, masses, or scars and thyroid normal to palpation  RESP: lungs clear to auscultation - no rales, rhonchi or wheezes  CV: regular rate and rhythm, normal S1 S2, no S3 or S4, no murmur, click or rub, no peripheral edema and peripheral pulses strong  ABDOMEN: soft, nontender, no hepatosplenomegaly, no masses and bowel sounds normal  SKIN: no suspicious lesions or rashes  BACK: no CVA tenderness, no paralumbar tenderness  PSYCH: mentation appears normal, affect normal/bright                    "

## 2023-05-19 DIAGNOSIS — E11.65 TYPE 2 DIABETES MELLITUS WITH HYPERGLYCEMIA, WITHOUT LONG-TERM CURRENT USE OF INSULIN (H): Primary | ICD-10-CM

## 2023-05-19 LAB
ALBUMIN SERPL-MCNC: 3.4 G/DL (ref 3.4–5)
ALP SERPL-CCNC: 89 U/L (ref 40–150)
ALT SERPL W P-5'-P-CCNC: 34 U/L (ref 0–50)
ANION GAP SERPL CALCULATED.3IONS-SCNC: 7 MMOL/L (ref 3–14)
AST SERPL W P-5'-P-CCNC: 20 U/L (ref 0–45)
BILIRUB SERPL-MCNC: 0.4 MG/DL (ref 0.2–1.3)
BUN SERPL-MCNC: 15 MG/DL (ref 7–30)
CALCIUM SERPL-MCNC: 9 MG/DL (ref 8.5–10.1)
CHLORIDE BLD-SCNC: 100 MMOL/L (ref 94–109)
CO2 SERPL-SCNC: 27 MMOL/L (ref 20–32)
CREAT SERPL-MCNC: 0.7 MG/DL (ref 0.52–1.04)
GFR SERPL CREATININE-BSD FRML MDRD: >90 ML/MIN/1.73M2
GLUCOSE BLD-MCNC: 382 MG/DL (ref 70–99)
POTASSIUM BLD-SCNC: 4.2 MMOL/L (ref 3.4–5.3)
PROT SERPL-MCNC: 7.2 G/DL (ref 6.8–8.8)
SODIUM SERPL-SCNC: 134 MMOL/L (ref 133–144)
T4 FREE SERPL-MCNC: 0.64 NG/DL (ref 0.76–1.46)
TSH SERPL DL<=0.005 MIU/L-ACNC: 6.83 MU/L (ref 0.4–4)

## 2023-05-19 RX ORDER — LEVOTHYROXINE SODIUM 75 UG/1
75 TABLET ORAL DAILY
Qty: 90 TABLET | Refills: 0 | Status: SHIPPED | OUTPATIENT
Start: 2023-05-19 | End: 2023-12-04

## 2023-05-22 DIAGNOSIS — E11.65 TYPE 2 DIABETES MELLITUS WITH HYPERGLYCEMIA, WITHOUT LONG-TERM CURRENT USE OF INSULIN (H): Primary | Chronic | ICD-10-CM

## 2023-08-10 ENCOUNTER — TELEPHONE (OUTPATIENT)
Dept: FAMILY MEDICINE | Facility: CLINIC | Age: 63
End: 2023-08-10

## 2023-08-10 NOTE — TELEPHONE ENCOUNTER
Patient Quality Outreach    Patient is due for the following:   Colon Cancer Screening  Breast Cancer Screening - Mammogram  Cervical Cancer Screening - PAP Needed  Physical Preventive Adult Physical    Next Steps:   Schedule a Adult Preventative    Type of outreach:    Sent [facility] message.      Questions for provider review:    None           Deondre Noble MA  Chart routed to Care Team.

## 2023-08-10 NOTE — LETTER
September 19, 2023      Lia Lewis  [address]  [place] [address]        Your team at [facility] cares about your health. We have reviewed your chart and based on our findings; we are making the following recommendations to better manage your health.     You are in particular need of attention regarding the following:     Schedule a DIABETIC FOLLOW UP appointment for Office Visit. Patients with diabetes should see their provider regularly.  Schedule a DIABETIC EYE EXAM.  This exam is done with an optometrist, annually. You can schedule this appointment with your eye doctor.  If you need a referral, please let us know.  Schedule Annual MAMMOGRAPHY. [facility] scheduling number is [phone] or schedule in CUVISM MAGAZINEhart (self referral).  1 in 8 women will develop invasive breast cancer during her lifetime and it is the most common non-skin cancer in American Women. EARLY detection, new treatments, and a better understanding of the disease have increased survival rates- the 5 year survival rate in the 1960's was 63% and today it is close to 90%.  If you are under/uninsured, we recommend you contact the [facility]. They offer mammograms at no charge or on a sliding fee charge. You can schedule with them at [phone]. Please have them send us the results.   Schedule a primary care office visit with your provider for a Pap Smear to screen for Cervical Cancer.  Call or MyChart message your clinic to schedule a colonoscopy, schedule/ a FIT Test, or order a Cologuard test. If you are unsure what type of test you need, please call your clinic and speak to clinic staff.   Colon cancer is now the second leading cause of cancer-related deaths in the [place] for both men and women and there are over 130,000 new cases and 50,000 deaths per year from colon cancer. Colonoscopies can prevent 90-95% of these deaths. Problem lesions can be removed before they ever become cancer.  This test is not only looking for cancer, but also getting rid of precancerous lesions.   If you are under/uninsured, we recommend you contact the [employer] Program.[employer] is a free colorectal cancer screening program that provides colonoscopies for eligible under/uninsured [place] men and women. If you are interested in receiving a free colonoscopy, please call [employer] at t [phone] (mention code ScopesWeb) to see if you're eligible. Please have them send us the results.   Contact your clinic to schedule/ your FIT Test.   PREVENTATIVE VISIT: Physical    If you have already completed these items, please contact the clinic via phone or   Cub[employer] Tele[employer]hart so your care team can review and update your records. Thank you for   choosing [facility] for your healthcare needs. For any questions,   concerns, or to schedule an appointment please contact our clinic.    Healthy Regards,      Your [facility] Care Team

## 2023-09-16 ENCOUNTER — HEALTH MAINTENANCE LETTER (OUTPATIENT)
Age: 63
End: 2023-09-16

## 2023-09-19 NOTE — TELEPHONE ENCOUNTER
Patient Quality Outreach    Patient is due for the following:   Diabetes -  A1C and Eye Exam  Colon Cancer Screening  Breast Cancer Screening - Mammogram  Cervical Cancer Screening - PAP Needed  Physical Preventive Adult Physical    Next Steps:   Schedule a Adult Preventative    Type of outreach:    Sent letter.    Next Steps:  Reach out within 90 days via Letter.    Max number of attempts reached: No. Will try again in 90 days if patient still on fail list.    Questions for provider review:    None           Deondre Noble MA

## 2023-09-22 DIAGNOSIS — E11.65 TYPE 2 DIABETES MELLITUS WITH HYPERGLYCEMIA, WITHOUT LONG-TERM CURRENT USE OF INSULIN (H): ICD-10-CM

## 2023-09-22 DIAGNOSIS — E78.5 HYPERLIPIDEMIA LDL GOAL <100: ICD-10-CM

## 2023-09-22 DIAGNOSIS — F41.1 ANXIETY STATE: ICD-10-CM

## 2023-09-22 RX ORDER — ROSUVASTATIN CALCIUM 20 MG/1
20 TABLET, COATED ORAL DAILY
Qty: 60 TABLET | Refills: 0 | Status: SHIPPED | OUTPATIENT
Start: 2023-09-22 | End: 2023-12-04

## 2023-09-22 RX ORDER — AMITRIPTYLINE HYDROCHLORIDE 100 MG/1
TABLET ORAL
Qty: 60 TABLET | Refills: 0 | Status: SHIPPED | OUTPATIENT
Start: 2023-09-22 | End: 2023-12-04

## 2023-09-22 RX ORDER — SERTRALINE HYDROCHLORIDE 100 MG/1
TABLET, FILM COATED ORAL
Qty: 90 TABLET | Refills: 0 | Status: SHIPPED | OUTPATIENT
Start: 2023-09-22 | End: 2023-12-04

## 2023-09-22 RX ORDER — LISINOPRIL 5 MG/1
TABLET ORAL
Qty: 60 TABLET | Refills: 0 | Status: SHIPPED | OUTPATIENT
Start: 2023-09-22 | End: 2023-12-04

## 2023-12-04 ENCOUNTER — MYC REFILL (OUTPATIENT)
Dept: FAMILY MEDICINE | Facility: CLINIC | Age: 63
End: 2023-12-04
Payer: COMMERCIAL

## 2023-12-04 DIAGNOSIS — E78.5 HYPERLIPIDEMIA LDL GOAL <100: ICD-10-CM

## 2023-12-04 DIAGNOSIS — F41.1 ANXIETY STATE: ICD-10-CM

## 2023-12-04 RX ORDER — SERTRALINE HYDROCHLORIDE 100 MG/1
TABLET, FILM COATED ORAL
Qty: 90 TABLET | Refills: 0 | Status: SHIPPED | OUTPATIENT
Start: 2023-12-04 | End: 2024-02-05

## 2023-12-04 RX ORDER — ROSUVASTATIN CALCIUM 20 MG/1
20 TABLET, COATED ORAL DAILY
Qty: 60 TABLET | Refills: 0 | Status: SHIPPED | OUTPATIENT
Start: 2023-12-04 | End: 2023-12-20

## 2023-12-04 RX ORDER — AMITRIPTYLINE HYDROCHLORIDE 100 MG/1
TABLET ORAL
Qty: 60 TABLET | Refills: 0 | Status: SHIPPED | OUTPATIENT
Start: 2023-12-04 | End: 2023-12-20

## 2023-12-13 NOTE — PROGRESS NOTES
"  Assessment & Plan     Type 2 diabetes mellitus with hyperglycemia, without long-term current use of insulin (H)  Ongoing   - HEMOGLOBIN A1C; Future  - lisinopril (ZESTRIL) 5 MG tablet; Take 1 tablet by mouth once daily  - metFORMIN (GLUCOPHAGE) 1000 MG tablet; Take 1 tablet (1,000 mg) by mouth 2 times daily (with meals)  Recheck A1c today. Patient with financial stressors affording medication, was off of metformin for a while. Did discuss new medication additions likely    Hyperlipidemia LDL goal <100  refilled  - rosuvastatin (CRESTOR) 20 MG tablet; Take 1 tablet (20 mg) by mouth daily    Anxiety state  Stable   - amitriptyline (ELAVIL) 100 MG tablet; Take 1 tablet by mouth at bedtime  Refills   Hypothyroidism due to acquired atrophy of thyroid  Recheck today  - TSH with free T4 reflex; Future  - levothyroxine (SYNTHROID/LEVOTHROID) 75 MCG tablet; Take 1 tablet (75 mcg) by mouth daily  Refilled     Looking to get better insurance, will get eye exam done soon.         BMI:   Estimated body mass index is 33.86 kg/m  as calculated from the following:    Height as of this encounter: 1.689 m (5' 6.5\").    Weight as of this encounter: 96.6 kg (213 lb).   Weight management plan: Discussed healthy diet and exercise guidelines    Follow up 6 months diabetic recheck     CARRIE OSBORN [facility]    Kevin Rebolledo is a 63 year old, presenting for the following health issues:  Diabetes            12/20/2023     4:44 PM   Additional Questions   Roomed by Tanika Sagastume MA   Accompanied by Self       History of Present Illness       Mental Health Follow-up:  Patient presents to follow-up on Depression & Anxiety.Patient's depression since last visit has been:  Medium  The patient is having other symptoms associated with depression.  Patient's anxiety since last visit has been:  Medium  The patient is having other symptoms associated with anxiety.  Any significant life events: job concerns, financial " "concerns and health concerns  Patient is feeling anxious or having panic attacks.  Patient has no concerns about alcohol or drug use.    Diabetes:   She presents for follow up of diabetes.  She is checking home blood glucose one time daily.   She checks blood glucose before meals.  Blood glucose is sometimes over 200 and never under 70. She is aware of hypoglycemia symptoms including shakiness.   She is concerned about blood sugar frequently over 200.    She is not experiencing numbness or burning in feet, excessive thirst, blurry vision, weight changes or redness, sores or blisters on feet. The patient has not had a diabetic eye exam in the last 12 months.          Hyperlipidemia:  She presents for follow up of hyperlipidemia.   She is taking medication to lower cholesterol. She is not having myalgia or other side effects to statin medications.    Hypertension: She presents for follow up of hypertension.  She does check blood pressure  regularly outside of the clinic. Outpatient blood pressures have not been over 140/90. She follows a low salt diet.     Hypothyroidism:     Since last visit, patient describes the following symptoms::  Anxiety, Depression, Dry skin, Fatigue and Hair loss    Headaches:   Since the patient's last clinic visit, headaches are: no change  The patient is getting headaches:  4 or 5 times a month  She is able to do normal daily activities when she has a migraine.  The patient is taking the following rescue/relief medications:  Tylenol, Excedrin and sumatriptan (Imitrex)   Patient states \"I get some relief\" from the rescue/relief medications.   The patient is taking the following medications to prevent migraines:  Amitriptyline  In the past 4 weeks, the patient has gone to an Urgent Care or Emergency Room 0 times times due to headaches.    She eats 2-3 servings of fruits and vegetables daily.She consumes 0 sweetened beverage(s) daily.She exercises with enough effort to increase her heart rate " "20 to 29 minutes per day.  She exercises with enough effort to increase her heart rate 5 days per week.   She is taking medications regularly.           Review of Systems   Constitutional, HEENT, cardiovascular, pulmonary, GI, , musculoskeletal, neuro, skin, endocrine and psych systems are negative, except as otherwise noted.          Objective    /65   Pulse 90   Temp 97.3  F (36.3  C) (Tympanic)   Resp 16   Ht 1.689 m (5' 6.5\")   Wt 96.6 kg (213 lb)   LMP  (LMP Unknown)   SpO2 100%   Breastfeeding No   BMI 33.86 kg/m    Body mass index is 33.86 kg/m .      Physical Exam   GENERAL: healthy, alert and no distress  NECK: no adenopathy, no asymmetry, masses, or scars and thyroid normal to palpation  RESP: lungs clear to auscultation - no rales, rhonchi or wheezes  CV: regular rate and rhythm, normal S1 S2, no S3 or S4, no murmur, click or rub, no peripheral edema and peripheral pulses strong  ABDOMEN: soft, nontender, no hepatosplenomegaly, no masses and bowel sounds normal  MS: no gross musculoskeletal defects noted, no edema  Diabetic foot exam: normal DP and PT pulses, no trophic changes or ulcerative lesions, and normal sensory exam                      "

## 2023-12-20 ENCOUNTER — OFFICE VISIT (OUTPATIENT)
Dept: FAMILY MEDICINE | Facility: CLINIC | Age: 63
End: 2023-12-20
Payer: COMMERCIAL

## 2023-12-20 VITALS
HEART RATE: 90 BPM | HEIGHT: 67 IN | RESPIRATION RATE: 16 BRPM | TEMPERATURE: 97.3 F | WEIGHT: 213 LBS | DIASTOLIC BLOOD PRESSURE: 65 MMHG | SYSTOLIC BLOOD PRESSURE: 100 MMHG | BODY MASS INDEX: 33.43 KG/M2 | OXYGEN SATURATION: 100 %

## 2023-12-20 DIAGNOSIS — E78.5 HYPERLIPIDEMIA LDL GOAL <100: ICD-10-CM

## 2023-12-20 DIAGNOSIS — E11.65 TYPE 2 DIABETES MELLITUS WITH HYPERGLYCEMIA, WITHOUT LONG-TERM CURRENT USE OF INSULIN (H): Primary | ICD-10-CM

## 2023-12-20 DIAGNOSIS — F41.1 ANXIETY STATE: ICD-10-CM

## 2023-12-20 DIAGNOSIS — E03.4 HYPOTHYROIDISM DUE TO ACQUIRED ATROPHY OF THYROID: ICD-10-CM

## 2023-12-20 PROBLEM — Z71.89 ADVANCED DIRECTIVES, COUNSELING/DISCUSSION: Status: ACTIVE | Noted: 2023-12-20

## 2023-12-20 LAB — HBA1C MFR BLD: 10.6 % (ref 0–5.6)

## 2023-12-20 PROCEDURE — 84443 ASSAY THYROID STIM HORMONE: CPT | Performed by: PHYSICIAN ASSISTANT

## 2023-12-20 PROCEDURE — 99214 OFFICE O/P EST MOD 30 MIN: CPT | Performed by: PHYSICIAN ASSISTANT

## 2023-12-20 PROCEDURE — 36415 COLL VENOUS BLD VENIPUNCTURE: CPT | Performed by: PHYSICIAN ASSISTANT

## 2023-12-20 PROCEDURE — 83036 HEMOGLOBIN GLYCOSYLATED A1C: CPT | Performed by: PHYSICIAN ASSISTANT

## 2023-12-20 RX ORDER — LISINOPRIL 5 MG/1
TABLET ORAL
Qty: 90 TABLET | Refills: 3 | Status: SHIPPED | OUTPATIENT
Start: 2023-12-20

## 2023-12-20 RX ORDER — ROSUVASTATIN CALCIUM 20 MG/1
20 TABLET, COATED ORAL DAILY
Qty: 90 TABLET | Refills: 3 | Status: SHIPPED | OUTPATIENT
Start: 2023-12-20

## 2023-12-20 RX ORDER — AMITRIPTYLINE HYDROCHLORIDE 100 MG/1
TABLET ORAL
Qty: 90 TABLET | Refills: 3 | Status: SHIPPED | OUTPATIENT
Start: 2023-12-20

## 2023-12-20 RX ORDER — LEVOTHYROXINE SODIUM 75 UG/1
75 TABLET ORAL DAILY
Qty: 90 TABLET | Refills: 1 | Status: SHIPPED | OUTPATIENT
Start: 2023-12-20

## 2023-12-20 ASSESSMENT — ANXIETY QUESTIONNAIRES
GAD7 TOTAL SCORE: 13
6. BECOMING EASILY ANNOYED OR IRRITABLE: MORE THAN HALF THE DAYS
IF YOU CHECKED OFF ANY PROBLEMS ON THIS QUESTIONNAIRE, HOW DIFFICULT HAVE THESE PROBLEMS MADE IT FOR YOU TO DO YOUR WORK, TAKE CARE OF THINGS AT HOME, OR GET ALONG WITH OTHER PEOPLE: SOMEWHAT DIFFICULT
7. FEELING AFRAID AS IF SOMETHING AWFUL MIGHT HAPPEN: MORE THAN HALF THE DAYS
3. WORRYING TOO MUCH ABOUT DIFFERENT THINGS: MORE THAN HALF THE DAYS
2. NOT BEING ABLE TO STOP OR CONTROL WORRYING: MORE THAN HALF THE DAYS
4. TROUBLE RELAXING: MORE THAN HALF THE DAYS
1. FEELING NERVOUS, ANXIOUS, OR ON EDGE: MORE THAN HALF THE DAYS
GAD7 TOTAL SCORE: 13
5. BEING SO RESTLESS THAT IT IS HARD TO SIT STILL: SEVERAL DAYS

## 2023-12-20 ASSESSMENT — PAIN SCALES - GENERAL: PAINLEVEL: NO PAIN (0)

## 2023-12-20 NOTE — COMMUNITY RESOURCES LIST (ENGLISH)
12/20/2023   [facility]  N/A  For questions about this resource list or additional care needs, please contact your primary care clinic or care manager.  Phone: [phone]   Email: N/A   Address: [address] [place] [address]   Hours: N/A        Financial Stability       Rent and mortgage payment assistance  1  [facility] - Family Homeless Prevention Assistance Project (FHPAP) Distance: 2.66 miles      Phone/Virtual   [address] [facility] [address] [place] [address]  Language: English  Hours: Mon - Fri 8:30 AM - 12:00 PM , Mon - Fri 1:00 PM - 4:00 PM  Fees: Free   Phone: (924) 954-3671 Email: олег@[facility].[employer].Signicast Website: https://www.Sorrento Therapeuticsusa.org/usn/     2  Neighborhood Assistance [employer] - Home Save Program Distance: 4.42 miles      Phone/Virtual   [address] [facility] [address] [place] [id number]  Language: English, Slovak  Hours: Mon - Fri 9:00 AM - 5:00 PM  Fees: Free   Phone: (527) 488-1707 Email: services@[employer] Website: https://www.naca.com          Food and Nutrition       Food pantry  3  [facility] - Food Distribution Distance: 2.18 miles      [facility]   [address] [place] [address]  Language: English, Slovak  Hours: Tue 11:00 AM - 12:00 PM , Tue 12:00 PM - 3:30 PM Appt. Only, Thu 12:00 PM - 3:30 PM Appt. Only  Fees: Free   Phone: (262) 565-7739 Email: Cathleen@[facility].[employer].Signicast Website: http://FetchDog.org/[facility]/     4  [facility] Distance: 2.66 miles      Pickup   [address] [facility] [address] [place] [address]  Language: English  Hours: Mon - Fri 8:30 AM - 12:00 PM , Mon - Fri 1:00 PM - 4:00 PM  Fees: Free, Self Pay   Phone: (134) 110-5457 Email: олег@[facility].[employer].Signicast Website: https://www.Nazara Technologiesyusa.org/usn/     SNAP application  assistance  5  [facility] Economic Assistance Department Distance: 2.68 miles      Phone/Virtual   [address] [address]  Language: English  Hours: Mon - Fri 8:15 AM - 4:00 PM  Fees: Free   Phone: (861) 499-6376 Email: paperwork@[place]. Website: http://www.[facility]./193/Economic-Assistance     6  [facility]  Bhutanese Family Wellness (AIFW) Distance: 3.87 miles      In-Person, Phone/Virtual   [address] [place] [address]  Language: Czech, Azeri, English, Gujarati, Etienne, Maori, Khmer, French, Japanese, Armenian  Hours: Mon - Wed 9:00 AM - 5:00 PM , Thu 12:00 PM - 6:00 PM , Fri 9:00 AM - 5:00 PM , Sun 10:30 AM - 2:00 PM Appt. Only  Fees: Free   Phone: (898) 121-7287 Email: info@[facility]-aifw.org Website: https://www.[facility]-aifw.org/     Soup kitchen or free meals  7  [facility] - Family Table Meal Distance: 3.02 miles      In-Person, Pickup   [address] [place] [address]  Language: English  Hours: Thu 5:30 PM - 6:30 PM  Fees: Free   Phone: (682) 556-6438 Email: office@[employer]ila[employer]u.org Website: http://www.[place]ilaKutu.Imitix     8  [facility] - Family Table Meals - Family Table Meal Distance: 3.43 miles      Pickup   [address] [place] [address]  Language: English  Hours: Thu 5:00 PM - 6:30 PM  Fees: Free   Phone: (526) 508-5910 Email: [employer]@Vocent.Imitix Website: http://www.Vocent.org          Important Numbers & Websites       Emergency Services   911  City Services   311  Poison Control   (710) 886-2056  Suicide Prevention Lifeline   (937) 119-2757 (TALK)  Child Abuse Hotline   (632) 878-2783 (4-A-Child)  Sexual Assault Hotline   (197) 560-6451 (HOPE)  National Runaway Safeline   (428) 510-4616 (RUNAWAY)  All-Options Talkline   (166) 856-6571  Substance Abuse Referral   (879) 694-8955 (HELP)

## 2023-12-20 NOTE — COMMUNITY RESOURCES LIST (ENGLISH)
12/20/2023   [facility] Clin[facility]  N/A  For additional resource needs, please contact your health insurance member services or your primary care team.  Phone: [phone]   Email: N/A   Address: [address] [place] [address]   Hours: N/A        Financial Stability       Rent and mortgage payment assistance  1  [facility] - Family Homeless Prevention Assistance Project (FHPAP) Distance: 2.66 miles      Phone/Virtual   [address] [facility] [address] [place] [address]  Language: English  Hours: Mon - Fri 8:30 AM - 12:00 PM , Mon - Fri 1:00 PM - 4:00 PM  Fees: Free   Phone: (377) 721-4238 Email: олег@[facility].[employer] Website: https://www.Robotgalaxyusa.org/usn/     2  Neighborhood Assistance [employer] - Home Save Program Distance: 4.42 miles      Phone/Virtual   [address] [facility] [address] [place] [address]  Language: English, Korean  Hours: Mon - Fri 9:00 AM - 5:00 PM  Fees: Free   Phone: (483) 423-6963 Email: services@YouHelp Website: https://www.naca.com          Food and Nutrition       Food pantry  3  [facility] - Food Distribution Distance: 2.18 miles      [facility]   [address] [place] [address]  Language: English, Korean  Hours: Tue 11:00 AM - 12:00 PM , Tue 12:00 PM - 3:30 PM Appt. Only, Thu 12:00 PM - 3:30 PM Appt. Only  Fees: Free   Phone: (567) 253-9011 Email: Cathleen@[facility].[employer] Website: http://ProviderTrust.org/[facility]/     4  [facility] Distance: 2.66 miles      Pickup   [address] [facility] [address] [place] [address]  Language: English  Hours: Mon - Fri 8:30 AM - 12:00 PM , Mon - Fri 1:00 PM - 4:00 PM  Fees: Free, Self Pay   Phone: (327) 657-5232 Email: олег@[facility].[employer]otgal[employer].org Website: https://www.Anystreamyusa.org/usn/     SNAP application  assistance  5  [employer] [place] Distance: 15.16 miles      Phone/Virtual   [address] [place] [address]  Language: English, Hmong, Costa Rican, Zimbabwean, Puerto Rican  Hours: Mon - Fri 8:30 AM - 4:30 PM  Fees: Free   Phone: (692) 301-3591 Email: helpline@hungersolutions.org Website: https://www.hungersolutions.org/programs/[place]-food-helpline/     6  [facility] Economic Assistance Department Distance: 2.68 miles      Phone/Virtual   [address] [place] [address]  Language: English  Hours: Mon - Fri 8:15 AM - 4:00 PM  Fees: Free   Phone: (766) 882-9496 Email: paperwork@[facility]. Website: http://www.[facility]./193/Economic-Assistance     Soup kitchen or free meals  7  [facility] - Family Table Meal Distance: 3.02 miles      In-Person, Pickup   [address] [place] [address]  Language: English  Hours: Thu 5:30 PM - 6:30 PM  Fees: Free   Phone: (926) 468-2360 Email: office@[facility]aloFlynnA[facility] Arizona [facility] HospitalVormetric.Ektron Website: http://www.BuffaloSphere 3d.Ektron     8  [facility] Family Table Meals - Family Table Meal Distance: 3.43 miles      Pickup   [address] [place] [address]  Language: English  Hours: Thu 5:00 PM - 6:30 PM  Fees: Free   Phone: (271) 117-2441 Email: Delphi@[facility].org Website: http://www.Arkeia Software.org          Important Numbers & Websites       [facility]   211 211unitedway.org  Poison Control   (204) 896-9445 [place]po[place].org  Suicide and Crisis Lifeline   [address] [place], [place] [address]line.org  Childhelp National Child Abuse Hotline   484.827.4533 Childhelphotline.org  National Sexual Assault Hotline   (569) 543-4339 (HOPE) Rainn.org  National Runaway Safeline   (143) 840-4923 (RUNAWAY) [facility] Cent[facility]una[facility].org  Pregnancy & Postpartum Support [place]   Call/text 940-740-9617 Ppsupportmn.org  Substance Abuse National Helpline ([facility]   083-357-HELP (2255) Findtreatment.gov  Emergency Services   912

## 2023-12-21 LAB — TSH SERPL DL<=0.005 MIU/L-ACNC: 2.57 UIU/ML (ref 0.3–4.2)

## 2023-12-21 RX ORDER — GLIPIZIDE 5 MG/1
5 TABLET, FILM COATED, EXTENDED RELEASE ORAL DAILY
Qty: 90 TABLET | Refills: 1 | Status: SHIPPED | OUTPATIENT
Start: 2023-12-21

## 2023-12-29 ENCOUNTER — MYC MEDICAL ADVICE (OUTPATIENT)
Dept: FAMILY MEDICINE | Facility: CLINIC | Age: 63
End: 2023-12-29
Payer: COMMERCIAL

## 2023-12-29 DIAGNOSIS — E11.65 TYPE 2 DIABETES MELLITUS WITH HYPERGLYCEMIA, WITHOUT LONG-TERM CURRENT USE OF INSULIN (H): Primary | ICD-10-CM

## 2024-02-04 DIAGNOSIS — F41.1 ANXIETY STATE: ICD-10-CM

## 2024-02-05 RX ORDER — SERTRALINE HYDROCHLORIDE 100 MG/1
TABLET, FILM COATED ORAL
Qty: 135 TABLET | Refills: 2 | Status: SHIPPED | OUTPATIENT
Start: 2024-02-05

## 2024-02-06 ENCOUNTER — TELEPHONE (OUTPATIENT)
Dept: FAMILY MEDICINE | Facility: CLINIC | Age: 64
End: 2024-02-06
Payer: COMMERCIAL

## 2024-02-06 NOTE — TELEPHONE ENCOUNTER
Forms/Letter Request    Type of form/letter: OTHER: Release of medical records       Do we have the form/letter: Yes:     Who is the form from? Patient    Where did/will the form come from? Patient or family brought in       When is form/letter needed by: No time limit    How would you like the form/letter returned: Fax : [phone]    Patient Notified form requests are processed in 5-7 business days:Yes    Could we send this information to you in [employer] or would you prefer to receive a phone call?:   Patient would like to be contacted via [employer]

## 2024-04-13 ENCOUNTER — HEALTH MAINTENANCE LETTER (OUTPATIENT)
Age: 64
End: 2024-04-13

## 2024-06-17 PROBLEM — Z71.89 ADVANCED DIRECTIVES, COUNSELING/DISCUSSION: Status: RESOLVED | Noted: 2023-12-20 | Resolved: 2024-06-17

## 2024-06-22 ENCOUNTER — HEALTH MAINTENANCE LETTER (OUTPATIENT)
Age: 64
End: 2024-06-22

## 2024-08-31 ENCOUNTER — HEALTH MAINTENANCE LETTER (OUTPATIENT)
Age: 64
End: 2024-08-31

## 2024-11-02 DIAGNOSIS — F41.1 ANXIETY STATE: ICD-10-CM

## 2024-11-04 RX ORDER — SERTRALINE HYDROCHLORIDE 100 MG/1
TABLET, FILM COATED ORAL
Qty: 45 TABLET | Refills: 0 | Status: SHIPPED | OUTPATIENT
Start: 2024-11-04

## 2024-12-07 DIAGNOSIS — F41.1 ANXIETY STATE: ICD-10-CM

## 2024-12-09 RX ORDER — SERTRALINE HYDROCHLORIDE 100 MG/1
TABLET, FILM COATED ORAL
Qty: 45 TABLET | Refills: 0 | Status: SHIPPED | OUTPATIENT
Start: 2024-12-09

## 2025-01-04 ENCOUNTER — HEALTH MAINTENANCE LETTER (OUTPATIENT)
Age: 65
End: 2025-01-04

## 2025-01-10 DIAGNOSIS — F41.1 ANXIETY STATE: ICD-10-CM

## 2025-01-10 NOTE — LETTER
January 13, 2025    Lia Lewis  [address] [facility] [address]    Dear Lia,       We recently received a refill request for sertraline (ZOLOFT) 100 MG tablet .  We have refilled this for a one time 30 day supply only because you are due for a:    Medication check office visit      Please call at your earliest convenience so that there will not be a delay with your future refills.          Thank you,   Your [facility] Team/  [phone]         Electronically signed

## 2025-01-13 RX ORDER — SERTRALINE HYDROCHLORIDE 100 MG/1
TABLET, FILM COATED ORAL
Qty: 45 TABLET | Refills: 0 | Status: SHIPPED | OUTPATIENT
Start: 2025-01-13

## 2025-04-19 ENCOUNTER — HEALTH MAINTENANCE LETTER (OUTPATIENT)
Age: 65
End: 2025-04-19

## 2025-07-12 ENCOUNTER — HEALTH MAINTENANCE LETTER (OUTPATIENT)
Age: 65
End: 2025-07-12

## 2025-08-02 ENCOUNTER — HEALTH MAINTENANCE LETTER (OUTPATIENT)
Age: 65
End: 2025-08-02